# Patient Record
Sex: FEMALE | Race: WHITE | NOT HISPANIC OR LATINO | Employment: FULL TIME | URBAN - METROPOLITAN AREA
[De-identification: names, ages, dates, MRNs, and addresses within clinical notes are randomized per-mention and may not be internally consistent; named-entity substitution may affect disease eponyms.]

---

## 2017-03-23 ENCOUNTER — OFFICE VISIT (OUTPATIENT)
Dept: FAMILY MEDICINE | Facility: CLINIC | Age: 22
End: 2017-03-23
Payer: COMMERCIAL

## 2017-03-23 VITALS
BODY MASS INDEX: 24.75 KG/M2 | TEMPERATURE: 98.4 F | DIASTOLIC BLOOD PRESSURE: 62 MMHG | RESPIRATION RATE: 12 BRPM | SYSTOLIC BLOOD PRESSURE: 118 MMHG | HEIGHT: 66 IN | HEART RATE: 86 BPM | WEIGHT: 154 LBS

## 2017-03-23 DIAGNOSIS — M54.50 ACUTE LEFT-SIDED LOW BACK PAIN WITHOUT SCIATICA: Primary | ICD-10-CM

## 2017-03-23 LAB
ALBUMIN UR-MCNC: NEGATIVE MG/DL
AMORPH CRY #/AREA URNS HPF: ABNORMAL /HPF
APPEARANCE UR: CLEAR
BACTERIA #/AREA URNS HPF: ABNORMAL /HPF
BILIRUB UR QL STRIP: NEGATIVE
COLOR UR AUTO: YELLOW
GLUCOSE UR STRIP-MCNC: NEGATIVE MG/DL
HGB UR QL STRIP: ABNORMAL
KETONES UR STRIP-MCNC: NEGATIVE MG/DL
LEUKOCYTE ESTERASE UR QL STRIP: ABNORMAL
NITRATE UR QL: NEGATIVE
NON-SQ EPI CELLS #/AREA URNS LPF: ABNORMAL /LPF
PH UR STRIP: 7 PH (ref 5–7)
RBC #/AREA URNS AUTO: ABNORMAL /HPF (ref 0–2)
SP GR UR STRIP: 1.02 (ref 1–1.03)
URN SPEC COLLECT METH UR: ABNORMAL
UROBILINOGEN UR STRIP-ACNC: 0.2 EU/DL (ref 0.2–1)
WBC #/AREA URNS AUTO: ABNORMAL /HPF (ref 0–2)

## 2017-03-23 PROCEDURE — 99214 OFFICE O/P EST MOD 30 MIN: CPT | Performed by: NURSE PRACTITIONER

## 2017-03-23 PROCEDURE — 81001 URINALYSIS AUTO W/SCOPE: CPT | Performed by: NURSE PRACTITIONER

## 2017-03-23 RX ORDER — NAPROXEN 500 MG/1
500 TABLET ORAL 2 TIMES DAILY WITH MEALS
Qty: 30 TABLET | Refills: 1 | Status: SHIPPED | OUTPATIENT
Start: 2017-03-23 | End: 2017-11-27

## 2017-03-23 RX ORDER — METAXALONE 800 MG/1
800 TABLET ORAL 3 TIMES DAILY PRN
Qty: 30 TABLET | Refills: 0 | Status: SHIPPED | OUTPATIENT
Start: 2017-03-23 | End: 2017-11-27

## 2017-03-23 NOTE — MR AVS SNAPSHOT
"              After Visit Summary   3/23/2017    Rizwana Alford    MRN: [id number]           Patient Information     Date Of Birth          1995        Visit Information        Provider Department      3/23/2017 6:30 PM Haase, Susan Rachele, APRN CNP [facility]        Today's Diagnoses     Acute left-sided low back pain without sciatica    -  1       Follow-ups after your visit        Follow-up notes from your care team     Return in about 2 weeks (around 4/6/2017).      Who to contact     If you have questions or need follow up information about today's clinic visit or your schedule please contact [facility] directly at [phone].  Normal or non-critical lab and imaging results will be communicated to you by Verafinhart, letter or phone within 4 business days after the clinic has received the results. If you do not hear from us within 7 days, please contact the clinic through Verafinhart or phone. If you have a critical or abnormal lab result, we will notify you by phone as soon as possible.  Submit refill requests through ReClaims or call your pharmacy and they will forward the refill request to us. Please allow 3 business days for your refill to be completed.          Additional Information About Your Visit        MyChart Information     ReClaims gives you secure access to your electronic health record. If you see a primary care provider, you can also send messages to your care team and make appointments. If you have questions, please call your primary care clinic.  If you do not have a primary care provider, please call [phone] and they will assist you.        Care EveryWhere ID     This is your Care EveryWhere ID. This could be used by other organizations to access your [facility] medical records  [id number]        Your Vitals Were     Pulse Temperature Respirations Height BMI (Body Mass Index)       86 98.4  F (36.9  C) (Oral) 12 5' 6\" (1.676 m) 24.86 kg/m2        " Blood Pressure from Last 3 Encounters:   03/23/17 118/62   08/11/16 119/71   06/17/16 98/64    Weight from Last 3 Encounters:   03/23/17 154 lb (69.9 kg)   08/11/16 151 lb (68.5 kg)   06/17/16 143 lb (64.9 kg)              We Performed the Following     UA reflex to Microscopic and Culture     Urine Microscopic          Today's Medication Changes          These changes are accurate as of: 3/23/17  7:06 PM.  If you have any questions, ask your nurse or doctor.               Start taking these medicines.        Dose/Directions    metaxalone 800 MG tablet   Commonly known as:  SKELAXIN   Used for:  Acute left-sided low back pain without sciatica   Started by:  Haase, Susan Rachele, APRN CNP        Dose:  800 mg   Take 1 tablet (800 mg) by mouth 3 times daily as needed for moderate pain   Quantity:  30 tablet   Refills:  0       naproxen 500 MG tablet   Commonly known as:  NAPROSYN   Used for:  Acute left-sided low back pain without sciatica   Started by:  Haase, Susan Rachele, APRN CNP        Dose:  500 mg   Take 1 tablet (500 mg) by mouth 2 times daily (with meals)   Quantity:  30 tablet   Refills:  1            Where to get your medicines      These medications were sent to [place] [address] [place] [address] [address]     Phone:  [phone]     metaxalone 800 MG tablet    naproxen 500 MG tablet                Primary Care Provider    Valdo Umana       No address on file        Thank you!     Thank you for choosing [facility]  for your care. Our goal is always to provide you with excellent care. Hearing back from our patients is one way we can continue to improve our services. Please take a few minutes to complete the written survey that you may receive in the mail after your visit with us. Thank you!             Your Updated Medication List - Protect others around you: Learn how to safely use, store and throw away your medicines at  www.disposemymeds.org.          This list is accurate as of: 3/23/17  7:06 PM.  Always use your most recent med list.                   Brand Name Dispense Instructions for use    cyclobenzaprine 10 MG tablet    FLEXERIL    30 tablet    Take 0.5-1 tablets (5-10 mg) by mouth 3 times daily as needed for muscle spasms       metaxalone 800 MG tablet    SKELAXIN    30 tablet    Take 1 tablet (800 mg) by mouth 3 times daily as needed for moderate pain       naproxen 500 MG tablet    NAPROSYN    30 tablet    Take 1 tablet (500 mg) by mouth 2 times daily (with meals)       norethin-eth estradiol-fe 0.4-35 MG-MCG per tablet    FEMCON FE, WYMZA FE, ZENCHENT FE     Take  by mouth.

## 2017-03-23 NOTE — NURSING NOTE
"Chief Complaint   Patient presents with     Back Pain       Initial /62 (BP Location: Left arm, Patient Position: Chair, Cuff Size: Adult Regular)  Pulse 86  Temp 98.4  F (36.9  C) (Oral)  Resp 12  Ht 5' 6\" (1.676 m)  Wt 154 lb (69.9 kg)  BMI 24.86 kg/m2 Estimated body mass index is 24.86 kg/(m^2) as calculated from the following:    Height as of this encounter: 5' 6\" (1.676 m).    Weight as of this encounter: 154 lb (69.9 kg).  Medication Reconciliation: complete   Rizwana Noyola CMA      "

## 2017-03-23 NOTE — Clinical Note
Please abstract the following data from this visit with this patient into the appropriate field in Epic:  Pap smear done on this date: 7/1/2016 (approximately), by this group: urgent care, results were normal.  Chlamydia testing done on this date: 7/1/2016

## 2017-03-23 NOTE — PROGRESS NOTES
SUBJECTIVE:                                                    Rizwana Alford is a 21 year old female who presents to clinic today for the following health issues:    Back Pain      Duration: 3 days        Specific cause: lifting    Description:   Location of pain: low back left  Character of pain: sharp  Pain radiation:none  New numbness or weakness in legs, not attributed to pain:  no     Intensity: Currently 5/10, At its worst 8/10    History:   Pain interferes with job: yes, works as a   History of back problems: no prior back problems  Any previous MRI or X-rays: None  Sees a specialist for back pain:  No  Therapies tried without relief: cold, heat, muscle relaxants and NSAIDs    Alleviating factors:   Improved by: none      Precipitating factors:  Worsened by: Sitting    Functional and Psychosocial Screen (Cristi STarT Back):      Not performed today       Accompanying Signs & Symptoms:  Risk of Fracture:  None  Risk of Cauda Equina:  None  Risk of Infection:  None  Risk of Cancer:  None  Risk of Ankylosing Spondylitis:  Onset at age <35, male, AND morning back stiffness. no          Rizwana reports L sided lower back pain w/ onset of 2 days ago. 3 days ago she was lifting weights at the gym and did not experience any pain or signs of injury. The next morning she woke up in excruciating pain and unable to get out of bed. Pain is located in lumbar and iliac region of L back/hip.  Exacerbated by sitting up from lying down position and present in sitting position. Feels best when lying flat on back. Some relief after doing hip stretches. Rizwana is a  and states difficult to work w/ due to pain. Also difficulty sleeping. Has been icing and taking cyclobenzaprine to manage pain. Denies radiation of pain into LEs, paresthesia, or dysuria. LMP 3/21/2017.  Last episode of lower back pain was in 8/2016, lumbar xray at that time with normal result.    Problem list and histories reviewed & adjusted,  "as indicated.  Additional history: as documented    Patient Active Problem List   Diagnosis     Ovarian cysts     Past Surgical History:   Procedure Laterality Date     APPENDECTOMY       CYSTECTOMY OVARIAN BENIGN       TONSILLECTOMY         Social History   Substance Use Topics     Smoking status: Former Smoker     Smokeless tobacco: Never Used     Alcohol use No     Family History   Problem Relation Age of Onset     Colon Cancer Paternal Grandfather          Current Outpatient Prescriptions   Medication Sig Dispense Refill     cyclobenzaprine (FLEXERIL) 10 MG tablet Take 0.5-1 tablets (5-10 mg) by mouth 3 times daily as needed for muscle spasms 30 tablet 1     norethin-eth estradiol-fe 0.4-35 MG-MCG CHEW Take  by mouth.         No Known Allergies    Reviewed and updated as needed this visit by clinical staff    Reviewed and updated as needed this visit by Provider    ROS:  Constitutional, HEENT, cardiovascular, pulmonary, GI, , musculoskeletal, neuro, skin, endocrine and psych systems are negative, except as otherwise noted.    This document serves as a record of the services and decisions personally performed and made by Susan Haase, CNP. It was created on her behalf by Melida Salas, a trained medical scribe. The creation of this document is based the provider's statements to the medical scribe.  Melida Salas March 23, 2017 6:48 PM   OBJECTIVE:                                                    /62 (BP Location: Left arm, Patient Position: Chair, Cuff Size: Adult Regular)  Pulse 86  Temp 98.4  F (36.9  C) (Oral)  Resp 12  Ht 1.676 m (5' 6\")  Wt 69.9 kg (154 lb)  BMI 24.86 kg/m2  Body mass index is 24.86 kg/(m^2).  GENERAL: healthy, alert and no distress  RESP: lungs clear to auscultation - no rales, rhonchi or wheezes  CV: regular rate and rhythm, normal S1 S2, no S3 or S4, no murmur, click or rub, no peripheral edema   MS: Pain in lumbar and iliac region of L side of back and hip w/ " adduction of both legs (L>R), abduction of L leg, and flexion, very tender to palpation, no gross musculoskeletal defects noted, no edema  NEURO: Normal strength, tone, reflexes, able to walk on toes and heels, straight leg raise negative.  mentation intact and speech normal  PSYCH: mentation appears normal, affect normal/bright    Diagnostic Test Results:  No results found for this or any previous visit (from the past 24 hour(s)).     ASSESSMENT/PLAN:                                                    Rizwana was seen today for back pain.    Diagnoses and all orders for this visit:    Acute left-sided low back pain without sciatica:  UA with scant blood (has menses)  -     UA reflex to Microscopic and Culture  -     naproxen (NAPROSYN) 500 MG tablet; Take 1 tablet (500 mg) by mouth 2 times daily (with meals)  -     metaxalone (SKELAXIN) 800 MG tablet; Take 1 tablet (800 mg) by mouth 3 times daily as needed for moderate pain  -     Urine Microscopic  -     [facility] PT, HAND, AND CHIROPRACTIC REFERRAL    Discussed taking naproxen bid on a regular basis for next 3-4 days with food.  Take skelaxin prn.  Continue ice for 15-20 min tid and stretching.  Is interested in PT.    Follow up in 1-2 weeks, sooner as needed.  The information in this document, created by the medical scribe for me, accurately reflects the services I personally performed and the decisions made by me. I have reviewed and approved this document for accuracy.   Susan Haase, CNP Susan Haase, MIRTHA [facility] [place]

## 2017-03-28 ENCOUNTER — TELEPHONE (OUTPATIENT)
Dept: FAMILY MEDICINE | Facility: CLINIC | Age: 22
End: 2017-03-28

## 2017-03-28 NOTE — LETTER
[facility]  [address] [place], [place], [address]  [phone]        2017      Rizwana Alford  [address] [facility] [address]        Dear Rizwana,    I would like you to come in for a Chlamydia screen. This is important if you have ever been sexually active. Chlamydia is the most common sexually transmitted infection and because it is often without symptoms, the infection can go untreated. Thankfully, testing and treatment is easy. Chlamydia is diagnosed through a simple urine test (if you have not urinated in the last hour) or vaginal swab. The CDC and the [place] [facility] Department recommend all women who are sexually active and under the age of 26 years old be screened for Chlamydia.    *Three out of four women with Chlamydia have no Chlamydia symptoms.   *Half of men with Chlamydia have no Chlamydia symptoms.     Chlamydia is spread by:  *Vaginal sex    *Oral sex   *Anal sex       *Infected mother to     If left untreated, Chlamydia can:  *Spread to sex partners     *Lead to ectopic (tubal) pregnancy   *Lead to pelvic inflammatory disease    *Lead to infertility in men and women   *Make it easier to transmit or acquire HIV during sex    *Can be passed to  during childbirth and cause serious eye infection or pneumonia   *Can lead to premature delivery and low birth weight    NOTE: A person can be re-infected after treatment if re-exposed    Please make an appointment to see me so we can do this screening test and ensure that your health is protected. For an appointment call:  [facility] [phone]    Sincerely,  [facility]

## 2017-03-28 NOTE — TELEPHONE ENCOUNTER
Panel Management Review      Patient has the following on her problem list: None      Composite cancer screening  Chart review shows that this patient is due/due soon for the following chlamydia screening    Summary:    Patient is due/failing the following:   Chlamydia screening    Action needed:   Patient needs non-fasting lab only appointment    Type of outreach:    Sent letter.    Questions for provider review:    None                                                                                                                                    TONG Hernandez       Chart routed to Care Team .

## 2017-10-30 ENCOUNTER — OFFICE VISIT (OUTPATIENT)
Dept: FAMILY MEDICINE | Facility: CLINIC | Age: 22
End: 2017-10-30
Payer: COMMERCIAL

## 2017-10-30 VITALS
TEMPERATURE: 98.3 F | WEIGHT: 149.7 LBS | OXYGEN SATURATION: 98 % | SYSTOLIC BLOOD PRESSURE: 119 MMHG | DIASTOLIC BLOOD PRESSURE: 65 MMHG | BODY MASS INDEX: 24.06 KG/M2 | HEIGHT: 66 IN | RESPIRATION RATE: 16 BRPM | HEART RATE: 68 BPM

## 2017-10-30 DIAGNOSIS — N76.0 BACTERIAL VAGINITIS: Primary | ICD-10-CM

## 2017-10-30 DIAGNOSIS — B96.89 BACTERIAL VAGINITIS: Primary | ICD-10-CM

## 2017-10-30 LAB
SPECIMEN SOURCE: ABNORMAL
WET PREP SPEC: ABNORMAL

## 2017-10-30 PROCEDURE — 87210 SMEAR WET MOUNT SALINE/INK: CPT | Performed by: FAMILY MEDICINE

## 2017-10-30 PROCEDURE — 99213 OFFICE O/P EST LOW 20 MIN: CPT | Performed by: FAMILY MEDICINE

## 2017-10-30 RX ORDER — METRONIDAZOLE 500 MG/1
500 TABLET ORAL 3 TIMES DAILY
Qty: 21 TABLET | Refills: 0 | Status: SHIPPED | OUTPATIENT
Start: 2017-10-30 | End: 2017-11-27

## 2017-10-30 NOTE — MR AVS SNAPSHOT
"              After Visit Summary   10/30/2017    Rizwana Alford    MRN: [id number]           Patient Information     Date Of Birth          1995        Visit Information        Provider Department      10/30/2017 11:00 AM Kendell Mullins MD [facility]        Today's Diagnoses     Bacterial vaginitis    -  1       Follow-ups after your visit        Who to contact     If you have questions or need follow up information about today's clinic visit or your schedule please contact [facility] directly at [phone].  Normal or non-critical lab and imaging results will be communicated to you by Apsehart, letter or phone within 4 business days after the clinic has received the results. If you do not hear from us within 7 days, please contact the clinic through Cogniot or phone. If you have a critical or abnormal lab result, we will notify you by phone as soon as possible.  Submit refill requests through Udorse or call your pharmacy and they will forward the refill request to us. Please allow 3 business days for your refill to be completed.          Additional Information About Your Visit        MyChart Information     Udorse gives you secure access to your electronic health record. If you see a primary care provider, you can also send messages to your care team and make appointments. If you have questions, please call your primary care clinic.  If you do not have a primary care provider, please call [phone] and they will assist you.        Care EveryWhere ID     This is your Care EveryWhere ID. This could be used by other organizations to access your [place] medical records  [id number]        Your Vitals Were     Pulse Temperature Respirations Height Last Period Pulse Oximetry    68 98.3  F (36.8  C) (Oral) 16 5' 6\" (1.676 m) 10/23/2017 98%    Breastfeeding? BMI (Body Mass Index)                No 24.16 kg/m2           Blood Pressure from Last 3 Encounters: "   10/30/17 119/65   03/23/17 118/62   08/11/16 119/71    Weight from Last 3 Encounters:   10/30/17 149 lb 11.2 oz (67.9 kg)   03/23/17 154 lb (69.9 kg)   08/11/16 151 lb (68.5 kg)              We Performed the Following     Wet prep          Today's Medication Changes          These changes are accurate as of: 10/30/17 12:11 PM.  If you have any questions, ask your nurse or doctor.               Start taking these medicines.        Dose/Directions    metroNIDAZOLE 500 MG tablet   Commonly known as:  FLAGYL   Used for:  Bacterial vaginitis   Started by:  Kendell Mullins MD        Dose:  500 mg   Take 1 tablet (500 mg) by mouth 3 times daily   Quantity:  21 tablet   Refills:  0            Where to get your medicines      These medications were sent to [place] [address] [place] [address] [address]     Phone:  [phone]     metroNIDAZOLE 500 MG tablet                Primary Care Provider    Valdo Umana       No address on file        Equal Access to Services     [facility]: Hadii bridger webster hadasho Soomaali, waaxda luqadaha, qaybta kaalmada adeegyada, rita hernandez . So [facility] [phone].    ATENCIÓN: Si habla español, tiene a rojas disposición servicios gratuitos de asistencia lingüística. Llame al [phone].    We comply with applicable federal civil rights laws and [place] laws. We do not discriminate on the basis of race, color, national origin, age, disability, sex, sexual orientation, or gender identity.            Thank you!     Thank you for choosing [facility]  for your care. Our goal is always to provide you with excellent care. Hearing back from our patients is one way we can continue to improve our services. Please take a few minutes to complete the written survey that you may receive in the mail after your visit with us. Thank you!             Your Updated Medication List - Protect others  around you: Learn how to safely use, store and throw away your medicines at www.disposemymeds.org.          This list is accurate as of: 10/30/17 12:11 PM.  Always use your most recent med list.                   Brand Name Dispense Instructions for use Diagnosis    metaxalone 800 MG tablet    SKELAXIN    30 tablet    Take 1 tablet (800 mg) by mouth 3 times daily as needed for moderate pain    Acute left-sided low back pain without sciatica       metroNIDAZOLE 500 MG tablet    FLAGYL    21 tablet    Take 1 tablet (500 mg) by mouth 3 times daily    Bacterial vaginitis       naproxen 500 MG tablet    NAPROSYN    30 tablet    Take 1 tablet (500 mg) by mouth 2 times daily (with meals)    Acute left-sided low back pain without sciatica       norethin-eth estradiol-fe 0.4-35 MG-MCG per tablet    FEMCON FE, WYMZA FE, ZENCHENT FE     Take  by mouth.

## 2017-10-30 NOTE — NURSING NOTE
"Chief Complaint   Patient presents with     Vaginal Problem       Initial /65 (BP Location: Right arm, Patient Position: Chair, Cuff Size: Adult Regular)  Pulse 68  Temp 98.3  F (36.8  C) (Oral)  Resp 16  Ht 5' 6\" (1.676 m)  Wt 149 lb 11.2 oz (67.9 kg)  LMP 10/23/2017  SpO2 98%  Breastfeeding? No  BMI 24.16 kg/m2 Estimated body mass index is 24.16 kg/(m^2) as calculated from the following:    Height as of this encounter: 5' 6\" (1.676 m).    Weight as of this encounter: 149 lb 11.2 oz (67.9 kg).  Medication Reconciliation: complete   "

## 2017-10-30 NOTE — PROGRESS NOTES
"  SUBJECTIVE:   Rizwaan Alford is a 22 year old female who presents to clinic today for the following health issues:      Vaginal Symptoms  Onset: 1 month    Description:  Vaginal Discharge: creamy   Itching (Pruritis): YES  Burning sensation:  no   Odor: YES    Accompanying Signs & Symptoms:  Pain with Urination: no   Abdominal Pain: no   Fever: no     History:   Sexually active: no   New Partner: no   Possibility of Pregnancy:  No    Precipitating factors:   Recent Antibiotic Use: no     Alleviating factors:  none    Therapies Tried and outcome: none    Past Medical History:   Diagnosis Date     Ovarian cysts        Past Surgical History:   Procedure Laterality Date     APPENDECTOMY       CYSTECTOMY OVARIAN BENIGN       TONSILLECTOMY         Family History   Problem Relation Age of Onset     Colon Cancer Paternal Grandfather        Social History   Substance Use Topics     Smoking status: Former Smoker     Smokeless tobacco: Never Used     Alcohol use No     Wet prep nsv and trich: flagyl to treat both prn yeat treatment if flares,     First Bacterial vaginitis episode    (N76.0,  B96.89) Bacterial vaginitis  (primary encounter diagnosis)  Comment:   Plan: Wet prep, metroNIDAZOLE (FLAGYL) 500 MG tablet          No new partners, no pelvic pain,                ROS:  Constitutional, HEENT, cardiovascular, pulmonary, gi and gu systems are negative, except as otherwise noted.      OBJECTIVE:   /65 (BP Location: Right arm, Patient Position: Chair, Cuff Size: Adult Regular)  Pulse 68  Temp 98.3  F (36.8  C) (Oral)  Resp 16  Ht 5' 6\" (1.676 m)  Wt 149 lb 11.2 oz (67.9 kg)  LMP 10/23/2017  SpO2 98%  Breastfeeding? No  BMI 24.16 kg/m2  Body mass index is 24.16 kg/(m^2).   GENERAL: healthy, alert and no distress  EYES: Eyes grossly normal to inspection, PERRL and conjunctivae and sclerae normal  HENT: ear canals and TM's normal, nose and mouth without ulcers or lesions  NECK: no adenopathy, no asymmetry, " masses, or scars and thyroid normal to palpation  RESP: lungs clear to auscultation - no rales, rhonchi or wheezes  CV: regular rate and rhythm, normal S1 S2, no S3 or S4, no murmur, click or rub, no peripheral edema and peripheral pulses strong  ABDOMEN: soft, nontender, no hepatosplenomegaly, no masses and bowel sounds normal  SKIN: no suspicious lesions or rashes        Follow up prn with her primary doctor    Kendell Mullins MD  [facility]

## 2017-11-03 ENCOUNTER — TELEPHONE (OUTPATIENT)
Dept: FAMILY MEDICINE | Facility: CLINIC | Age: 22
End: 2017-11-03

## 2017-11-03 DIAGNOSIS — N76.0 ACUTE VAGINITIS: ICD-10-CM

## 2017-11-03 DIAGNOSIS — R11.2 NON-INTRACTABLE VOMITING WITH NAUSEA, UNSPECIFIED VOMITING TYPE: Primary | ICD-10-CM

## 2017-11-03 RX ORDER — ONDANSETRON 4 MG/1
4 TABLET, FILM COATED ORAL EVERY 8 HOURS PRN
Qty: 12 TABLET | Refills: 1 | Status: SHIPPED | OUTPATIENT
Start: 2017-11-03 | End: 2017-11-27

## 2017-11-03 RX ORDER — METRONIDAZOLE 7.5 MG/G
1 GEL VAGINAL 2 TIMES DAILY
Qty: 70 G | Refills: 0 | Status: SHIPPED | OUTPATIENT
Start: 2017-11-03 | End: 2017-11-08

## 2017-11-03 NOTE — TELEPHONE ENCOUNTER
Patient calling and states was vomiting all night last night from the Flagyl.  Wondering about alterative or something for nausea.  Discussed vaginal gel.  Wondering if can get something for nausea.  Please advise.  Call her back at [phone].  Xochilt Alonso RN

## 2017-11-04 ENCOUNTER — NURSE TRIAGE (OUTPATIENT)
Dept: NURSING | Facility: CLINIC | Age: 22
End: 2017-11-04

## 2017-11-04 NOTE — TELEPHONE ENCOUNTER
Clinic Action Needed:Patient states she is returning a clinic call from 11:00 AM today. Patient not certain the reason for the call today and no information found per  [facility]. Patient states she has used the Metrogel and nausea is still present, but had improved. Declined triage and declined to have telephone message sent to provider and will contact clinic during hours. Patient advised to call back if any further questions or concerns.           Additional Information    Negative: Nursing judgment    Negative: Information only call; adult is not ill or injured    Negative: Nursing judgment    Negative: Nursing judgment    Negative: Nursing judgment    Negative: Nursing judgment    Negative: Nursing judgment    Negative: Nursing judgment    Negative: Nursing judgment    Negative: Nursing judgment    Negative: Nursing judgment    Negative: Nursing judgment    Negative: Nursing judgment    Negative: Nursing judgment    Negative: Nursing judgment    Nursing judgment    Protocols used: NO GUIDELINE OR REFERENCE AVAILABLE-ADULT-

## 2017-11-04 NOTE — TELEPHONE ENCOUNTER
----- Message from Ruddy Terry sent at 11/4/2017  1:48 PM CDT -----  Reason for call:  Other   Patient called regarding (reason for call): call back  Additional comments: No.    Phone number to reach patient:  Home number on file [phone] (home)    Best Time:  Anytime.    Can we leave a detailed message on this number?  YES

## 2017-11-21 ENCOUNTER — TELEPHONE (OUTPATIENT)
Dept: FAMILY MEDICINE | Facility: CLINIC | Age: 22
End: 2017-11-21

## 2017-11-21 DIAGNOSIS — M54.50 ACUTE BILATERAL LOW BACK PAIN WITHOUT SCIATICA: ICD-10-CM

## 2017-11-21 NOTE — TELEPHONE ENCOUNTER
Pending Prescriptions:                       Disp   Refills    cyclobenzaprine (FLEXERIL) 10 MG tablet [*30 tab*1            Sig: TAKE ONE-HALF TO ONE TABLET (5-10 MG) BY MOUTH           THREE TIMES A DAY AS NEEDED FOR MUSCLE SPASMS    Controlled Substance Refill Request for   Problem List Complete:  No     PROVIDER TO CONSIDER COMPLETION OF PROBLEM LIST AND OVERVIEW/CONTROLLED SUBSTANCE AGREEMENT    Last Written Prescription Date:  08/11/2016  Last Fill Quantity: 30,   # refills: 1    [place] [facility] [place]: 10/30/2017    Future Office visit:     Controlled substance agreement on file: No.     Processing:  Fax Rx to [facility] pharmacy     checked in past 6 months?  No, route to SHEKHAR BROWNING

## 2017-11-24 RX ORDER — CYCLOBENZAPRINE HCL 10 MG
TABLET ORAL
Qty: 30 TABLET | Refills: 1 | OUTPATIENT
Start: 2017-11-24

## 2017-11-27 ENCOUNTER — OFFICE VISIT (OUTPATIENT)
Dept: FAMILY MEDICINE | Facility: CLINIC | Age: 22
End: 2017-11-27
Payer: COMMERCIAL

## 2017-11-27 VITALS
TEMPERATURE: 98.6 F | RESPIRATION RATE: 20 BRPM | HEART RATE: 83 BPM | BODY MASS INDEX: 24.37 KG/M2 | DIASTOLIC BLOOD PRESSURE: 83 MMHG | WEIGHT: 151 LBS | SYSTOLIC BLOOD PRESSURE: 126 MMHG

## 2017-11-27 DIAGNOSIS — M54.50 BILATERAL LOW BACK PAIN WITHOUT SCIATICA, UNSPECIFIED CHRONICITY: Primary | ICD-10-CM

## 2017-11-27 PROCEDURE — 99213 OFFICE O/P EST LOW 20 MIN: CPT | Performed by: PHYSICIAN ASSISTANT

## 2017-11-27 RX ORDER — CYCLOBENZAPRINE HCL 10 MG
5-10 TABLET ORAL 3 TIMES DAILY PRN
Qty: 30 TABLET | Refills: 1 | Status: SHIPPED | OUTPATIENT
Start: 2017-11-27 | End: 2019-06-06

## 2017-11-27 NOTE — PROGRESS NOTES
SUBJECTIVE:   Rizwana Alford is a 22 year old female who presents to clinic today for the following health issues:      Back Pain       Duration: 03/2017        Specific cause: possibly from working out/lifting weights    Description:   Location of pain: low back - left  Character of pain: sharp/stabbing  Pain radiation:radiates into the left buttocks  New numbness or weakness in legs, not attributed to pain:  no     Intensity: Currently 3/10, At its worst 10/10    History:   Pain interferes with job: YES  History of back problems: none  Any previous MRI or X-rays: None  Sees a specialist for back pain:  No-patient was referred to physical therapy-did not follow up  Therapies tried without relief: ibuprofen    Alleviating factors:   Improved by: flexeril      Precipitating factors:  Worsened by: bending, lifting    Functional and Psychosocial Screen (Cristi STarT Back):      Not performed today          Accompanying Signs & Symptoms:  Risk of Fracture:  None  Risk of Cauda Equina:  None  Risk of Infection:  None  Risk of Cancer:  None  Risk of Ankylosing Spondylitis:  Onset at age <35, male, AND morning back stiffness. no             Problem list and histories reviewed & adjusted, as indicated.  Additional history: as documented    Patient Active Problem List   Diagnosis     Ovarian cysts     Past Surgical History:   Procedure Laterality Date     APPENDECTOMY       CYSTECTOMY OVARIAN BENIGN       TONSILLECTOMY         Social History   Substance Use Topics     Smoking status: Former Smoker     Smokeless tobacco: Never Used     Alcohol use No     Family History   Problem Relation Age of Onset     Colon Cancer Paternal Grandfather          Current Outpatient Prescriptions   Medication Sig Dispense Refill     ORTHO TRI-CYCLEN, 28, 0.18/0.215/0.25 MG-35 MCG per tablet        cyclobenzaprine (FLEXERIL) 10 MG tablet Take 0.5-1 tablets (5-10 mg) by mouth 3 times daily as needed for muscle spasms 30 tablet 1     No Known  Allergies  BP Readings from Last 3 Encounters:   11/27/17 126/83   10/30/17 119/65   03/23/17 118/62    Wt Readings from Last 3 Encounters:   11/27/17 151 lb (68.5 kg)   10/30/17 149 lb 11.2 oz (67.9 kg)   03/23/17 154 lb (69.9 kg)                        Reviewed and updated as needed this visit by clinical staffTobacco  Allergies  Meds  Problems  Med Hx  Surg Hx  Fam Hx  Soc Hx        Reviewed and updated as needed this visit by Provider  Allergies  Meds  Problems         ROS:  Constitutional, msk, neuro, cardiovascular, pulmonary, gi and gu systems are negative, except as otherwise noted.      OBJECTIVE:   /83 (BP Location: Right arm, Patient Position: Chair, Cuff Size: Adult Large)  Pulse 83  Temp 98.6  F (37  C) (Oral)  Resp 20  Wt 151 lb (68.5 kg)  LMP 11/26/2017  BMI 24.37 kg/m2  Body mass index is 24.37 kg/(m^2).  GENERAL: healthy, alert and no distress  Comprehensive back pain exam:  Tenderness of left SI joint, Range of motion not limited by pain, Lower extremity strength functional and equal on both sides, Lower extremity reflexes within normal limits bilaterally, Lower extremity sensation normal and equal on both sides and Straight leg raise negative bilaterally  PSYCH: mentation appears normal, affect normal/bright    Diagnostic Test Results:  none     ASSESSMENT/PLAN:     (M54.5) Bilateral low back pain without sciatica, unspecified chronicity  (primary encounter diagnosis)  Comment: likely si joint dysfunction given history, exam, and recurrence. Has responded well to flexeril in the past. This will again be prescribed and also discussed scheduled nsaids. Has not followed up with therapy. Encouraging this to not only aid with recovery but also to aid with future prevention.   Plan: cyclobenzaprine (FLEXERIL) 10 MG tablet, RAYRAY         PT, HAND, AND CHIROPRACTIC REFERRAL        -Medication use and side effects discussed with the patient. Patient is in complete understanding and  agreement with plan.         Follow up: ernie Payne PA-C  [facility]

## 2017-11-27 NOTE — MR AVS SNAPSHOT
After Visit Summary   11/27/2017    Rizwana Alford    MRN: [id number]           Patient Information     Date Of Birth          1995        Visit Information        Provider Department      11/27/2017 8:30 AM Wagner Payne PA-C [facility]        Today's Diagnoses     Bilateral low back pain without sciatica, unspecified chronicity    -  1       Follow-ups after your visit        Additional Services     RAYRAY PT, HAND, AND CHIROPRACTIC REFERRAL       **This order will print in the [facility] Scheduling Office**    Physical Therapy, Hand Therapy and Chiropractic Care are available through:    *[facility]  *[facility]  *[facility]    Call one number to schedule at any of the above locations: [phone].    Your provider has referred you to: Physical Therapy at [facility] or [facility]    Indication/Reason for Referral: Low Back Pain  Onset of Illness: years  Therapy Orders: Evaluate and Treat  Special Programs: None  Special Request: None    Cristi Colin      Additional Comments for the Therapist or Chiropractor:     Please be aware that coverage of these services is subject to the terms and limitations of your health insurance plan.  Call member services at your health plan with any benefit or coverage questions.      Please bring the following to your appointment:    *Your personal calendar for scheduling future appointments  *Comfortable clothing                  Who to contact     If you have questions or need follow up information about today's clinic visit or your schedule please contact [facility] directly at [phone].  Normal or non-critical lab and imaging results will be communicated to you by MyChart, letter or phone within 4 business days after the clinic has received the results. If you do not hear from us within 7 days, please contact the clinic through MyChart or phone. If you have a critical  or abnormal lab result, we will notify you by phone as soon as possible.  Submit refill requests through [employer] or call your pharmacy and they will forward the refill request to us. Please allow 3 business days for your refill to be completed.          Additional Information About Your Visit        IPNetVoicehart Information     [employer] gives you secure access to your electronic health record. If you see a primary care provider, you can also send messages to your care team and make appointments. If you have questions, please call your primary care clinic.  If you do not have a primary care provider, please call [phone] and they will assist you.        Care EveryWhere ID     This is your Care EveryWhere ID. This could be used by other organizations to access your [place] medical records  [id number]        Your Vitals Were     Pulse Temperature Respirations Last Period BMI (Body Mass Index)       83 98.6  F (37  C) (Oral) 20 11/26/2017 24.37 kg/m2        Blood Pressure from Last 3 Encounters:   11/27/17 126/83   10/30/17 119/65   03/23/17 118/62    Weight from Last 3 Encounters:   11/27/17 151 lb (68.5 kg)   10/30/17 149 lb 11.2 oz (67.9 kg)   03/23/17 154 lb (69.9 kg)              We Performed the Following     RAYRAY PT, HAND, AND CHIROPRACTIC REFERRAL          Today's Medication Changes          These changes are accurate as of: 11/27/17  8:56 AM.  If you have any questions, ask your nurse or doctor.               Start taking these medicines.        Dose/Directions    cyclobenzaprine 10 MG tablet   Commonly known as:  FLEXERIL   Used for:  Bilateral low back pain without sciatica, unspecified chronicity   Started by:  Wagner Payne PA-C        Dose:  5-10 mg   Take 0.5-1 tablets (5-10 mg) by mouth 3 times daily as needed for muscle spasms   Quantity:  30 tablet   Refills:  1         Stop taking these medicines if you haven't already. Please contact your care team if you have questions.     metaxalone 800  MG tablet   Commonly known as:  SKELAXIN   Stopped by:  Wagner Payne PA-C           norethin-eth estradiol-fe 0.4-35 MG-MCG per tablet   Commonly known as:  FEMCON FE, WYMZA FE, ZENCHENT FE   Stopped by:  Wagner Payne PA-C                Where to get your medicines      These medications were sent to [place] [id number] [facility] - [address] [facility] [address]     Phone:  [phone]     cyclobenzaprine 10 MG tablet                Primary Care Provider    Valdo Umana       No address on file        Equal Access to Services     [facility]: Hadii bridger webster hadasho Soomaali, waaxda luqadaha, qaybta kaalmada adeegyada, rita hernandez . So [facility] [phone].    ATENCIÓN: Si habla español, tiene a rojas disposición servicios gratuitos de asistencia lingüística. [facility] [phone].    We comply with applicable federal civil rights laws and [place] laws. We do not discriminate on the basis of race, color, national origin, age, disability, sex, sexual orientation, or gender identity.            Thank you!     Thank you for choosing [facility]  for your care. Our goal is always to provide you with excellent care. Hearing back from our patients is one way we can continue to improve our services. Please take a few minutes to complete the written survey that you may receive in the mail after your visit with us. Thank you!             Your Updated Medication List - Protect others around you: Learn how to safely use, store and throw away your medicines at www.disposemymeds.org.          This list is accurate as of: 11/27/17  8:56 AM.  Always use your most recent med list.                   Brand Name Dispense Instructions for use Diagnosis    cyclobenzaprine 10 MG tablet    FLEXERIL    30 tablet    Take 0.5-1 tablets (5-10 mg) by mouth 3 times daily as needed for muscle spasms    Bilateral low back pain without  sciatica, unspecified chronicity       ORTHO TRI-CYCLEN (28) 0.18/0.215/0.25 MG-35 MCG per tablet   Generic drug:  norgestim-eth estrad triphasic

## 2017-11-29 ENCOUNTER — THERAPY VISIT (OUTPATIENT)
Dept: PHYSICAL THERAPY | Facility: CLINIC | Age: 22
End: 2017-11-29
Payer: COMMERCIAL

## 2017-11-29 DIAGNOSIS — M54.42 CHRONIC LEFT-SIDED LOW BACK PAIN WITH LEFT-SIDED SCIATICA: Primary | ICD-10-CM

## 2017-11-29 DIAGNOSIS — G89.29 CHRONIC LEFT-SIDED LOW BACK PAIN WITH LEFT-SIDED SCIATICA: Primary | ICD-10-CM

## 2017-11-29 PROCEDURE — 97161 PT EVAL LOW COMPLEX 20 MIN: CPT | Mod: GP | Performed by: PHYSICAL THERAPIST

## 2017-11-29 PROCEDURE — 97112 NEUROMUSCULAR REEDUCATION: CPT | Mod: GP | Performed by: PHYSICAL THERAPIST

## 2017-11-29 PROCEDURE — 97110 THERAPEUTIC EXERCISES: CPT | Mod: GP | Performed by: PHYSICAL THERAPIST

## 2017-11-29 NOTE — PROGRESS NOTES
Subjective:    Patient is a 22 year old female presenting with rehab back hpi. The history is provided by the patient. No  was used.   Rizwana Alford is a 22 year old female with a lumbar condition.  Condition occurred with:  Lifting and repetition/overuse.  Condition occurred: during recreation/sport.  This is a recurrent condition  Pt c/o LBP initially starting 3/2017 after working out/lifting.  Saw MD at that time and noted improvement with medications.  States symptoms returned 11/26/17 after doing squats.  MD order date 11/27/17..    Patient reports pain:  Lumbar spine left, central lumbar spine, mid lumbar spine and lower lumbar spine.  Radiates to:  Gluteals left.  Pain is described as shooting, stabbing and aching and is intermittent and reported as 3/10 and 8/10.   Pain is the same all the time (stiff in AM).  Symptoms are exacerbated by bending, twisting, lifting, standing and sitting and relieved by rest and muscle relaxants.  Since onset symptoms are gradually improving.        General health as reported by patient is good.  Pertinent medical history includes:  None.  Medical allergies: no.  Other surgeries include:  No.  Current medications:  Muscle relaxants.  Current occupation is /student.    Primary job tasks include:  Prolonged standing, lifting and repetitive tasks.                                Objective:    System         Lumbar/SI Evaluation  ROM:  Arom wnl lumbar: LES: pain during/after, HILDA NE duirng, increased ROM and centralized pain.  AROM Lumbar:   Flexion:          Mod loss +  Ext:                    Mod loss +   Side Bend:        Left:  ERT    Right:  Min loss +  Rotation:           Left:     Right:   Side Glide:        Left:     Right:         Strength: fair core stab recruitment-++with bridge.  abd strength 4-/5  Lumbar Myotomes:  normal                  Neural Tension/Mobility:  Lumbar:  Normal        Lumbar Palpation:    Tenderness present at Left:     Erector Spinae and PSIS          SI joint/Sacrum:    Equal heights                                                       General     ROS    Assessment/Plan:      Patient is a 22 year old female with lumbar complaints.    Patient has the following significant findings with corresponding treatment plan.                Diagnosis 1:  LBP  Pain -  hot/cold therapy, manual therapy, directional preference exercise and home program  Decreased ROM/flexibility - manual therapy and therapeutic exercise  Decreased strength - therapeutic exercise and therapeutic activities  Impaired muscle performance - neuro re-education  Decreased function - therapeutic activities  Impaired posture - neuro re-education    Therapy Evaluation Codes:   1) History comprised of:   Personal factors that impact the plan of care:      None.    Comorbidity factors that impact the plan of care are:      None.     Medications impacting care: Muscle relaxant.  2) Examination of Body Systems comprised of:   Body structures and functions that impact the plan of care:      Lumbar spine.   Activity limitations that impact the plan of care are:      Bending, Lifting, Sitting, Standing and Working.  3) Clinical presentation characteristics are:   Stable/Uncomplicated.  4) Decision-Making    Low complexity using standardized patient assessment instrument and/or measureable assessment of functional outcome.  Cumulative Therapy Evaluation is: Low complexity.    Previous and current functional limitations:  (See Goal Flow Sheet for this information)    Short term and Long term goals: (See Goal Flow Sheet for this information)     Communication ability:  Patient appears to be able to clearly communicate and understand verbal and written communication and follow directions correctly.  Treatment Explanation - The following has been discussed with the patient:   RX ordered/plan of care  Anticipated outcomes  Possible risks and side effects  This patient would benefit  from PT intervention to resume normal activities.   Rehab potential is excellent.    Frequency:  1 X week, once daily  Duration:  for 8 weeks  Discharge Plan:  Achieve all LTG.  Independent in home treatment program.  Reach maximal therapeutic benefit.    Please refer to the daily flowsheet for treatment today, total treatment time and time spent performing 1:1 timed codes.

## 2017-11-29 NOTE — MR AVS SNAPSHOT
After Visit Summary   11/29/2017    Rizwana Alford    MRN: [id number]           Patient Information     Date Of Birth          1995        Visit Information        Provider Department      11/29/2017 8:20 AM Rashid Crum, KASSIE [facility] [place] [facility]        Today's Diagnoses     Chronic left-sided low back pain with left-sided sciatica    -  1       Follow-ups after your visit        Your next 10 appointments already scheduled     Dec 06, 2017  9:00 AM CST   RAYRAY Spine with Rashid Crum PT   [facility] [place] [facility] ([facility])    [address]  [facility] [id number] [phone]              Who to contact     If you have questions or need follow up information about today's clinic visit or your schedule please contact [facility] PHYSICAL THERAPY directly at [phone].  Normal or non-critical lab and imaging results will be communicated to you by Crispy Gamerhart, letter or phone within 4 business days after the clinic has received the results. If you do not hear from us within 7 days, please contact the clinic through Crispy Gamerhart or phone. If you have a critical or abnormal lab result, we will notify you by phone as soon as possible.  Submit refill requests through [employer] or call your pharmacy and they will forward the refill request to us. Please allow 3 business days for your refill to be completed.          Additional Information About Your Visit        Crispy Gamerhart Information     [employer] gives you secure access to your electronic health record. If you see a primary care provider, you can also send messages to your care team and make appointments. If you have questions, please call your primary care clinic.  If you do not have a primary care provider, please call [phone] and they will assist you.        Care EveryWhere ID     This is your Care  EveryWhere ID. This could be used by other organizations to access your [place] medical records  [id number]        Your Vitals Were     Last Period                   11/26/2017            Blood Pressure from Last 3 Encounters:   11/27/17 126/83   10/30/17 119/65   03/23/17 118/62    Weight from Last 3 Encounters:   11/27/17 68.5 kg (151 lb)   10/30/17 67.9 kg (149 lb 11.2 oz)   03/23/17 69.9 kg (154 lb)              We Performed the Following     HC PT EVAL, LOW COMPLEXITY     RAYRAY INITIAL EVAL REPORT     NEUROMUSCULAR RE-EDUCATION     THERAPEUTIC EXERCISES        Primary Care Provider    Valdo Umana       No address on file        Equal Access to Services     [facility] : Hadii aad darin hadsantio Sojonathan, waaxda luqadaha, qaybta kaalmada adejaradyada, rita hernandez . So [facility] [phone].    ATENCIÓN: Si habla español, tiene a rojas disposición servicios gratuitos de asistencia lingüística. Llame al [phone].    We comply with applicable federal civil rights laws and [place] laws. We do not discriminate on the basis of race, color, national origin, age, disability, sex, sexual orientation, or gender identity.            Thank you!     Thank you for [facility]ICAL TH[facility]AP[facility]  for your care. Our goal is always to provide you with excellent care. Hearing back from our patients is one way we can continue to improve our services. Please take a few minutes to complete the written survey that you may receive in the mail after your visit with us. Thank you!             Your Updated Medication List - Protect others around you: Learn how to safely use, store and throw away your medicines at www.disposemymeds.org.          This list is accurate as of: 11/29/17  8:47 AM.  Always use your most recent med list.                   Brand Name Dispense Instructions for use Diagnosis    cyclobenzaprine 10 MG tablet    FLEXERIL    30 tablet    Take 0.5-1  tablets (5-10 mg) by mouth 3 times daily as needed for muscle spasms    Bilateral low back pain without sciatica, unspecified chronicity       ORTHO TRI-CYCLEN (28) 0.18/0.215/0.25 MG-35 MCG per tablet   Generic drug:  norgestim-eth estrad triphasic

## 2017-12-04 ENCOUNTER — TELEPHONE (OUTPATIENT)
Dept: FAMILY MEDICINE | Facility: CLINIC | Age: 22
End: 2017-12-04

## 2017-12-04 DIAGNOSIS — B37.31 CANDIDIASIS OF VULVA AND VAGINA: Primary | ICD-10-CM

## 2017-12-04 RX ORDER — FLUCONAZOLE 150 MG/1
150 TABLET ORAL
Qty: 2 TABLET | Refills: 0 | Status: SHIPPED | OUTPATIENT
Start: 2017-12-04 | End: 2019-06-06

## 2017-12-04 NOTE — TELEPHONE ENCOUNTER
Left message on VM Dr. Mullins sent the requested prescription to your pharmacy earlier today. Call back if questions.   Sudheer Hawkins RN

## 2017-12-04 NOTE — TELEPHONE ENCOUNTER
Pt calls as follow up 10/30/17 visit with  Dr. Susanna Simeon and treated for BV.   Recalls JM said to call if S&S vag yeast and he would treat (not found in visit note) over the phone.  Patient has No previous history of vag yeast.   One week vaginal discharge - kind of thick, white , itchiness, no odor. Just a lot of discharge   Denies urinary symptoms .    Informed visit was 5 weeks ago, advised OV (recheck wet prep).   Declined.   Expects tx for yeast over the phone.   Please advise.  Sudheer Hawikns, RN

## 2017-12-06 ENCOUNTER — THERAPY VISIT (OUTPATIENT)
Dept: PHYSICAL THERAPY | Facility: CLINIC | Age: 22
End: 2017-12-06
Payer: COMMERCIAL

## 2017-12-06 DIAGNOSIS — G89.29 CHRONIC LEFT-SIDED LOW BACK PAIN WITH LEFT-SIDED SCIATICA: ICD-10-CM

## 2017-12-06 DIAGNOSIS — M54.42 CHRONIC LEFT-SIDED LOW BACK PAIN WITH LEFT-SIDED SCIATICA: ICD-10-CM

## 2017-12-06 PROCEDURE — 97110 THERAPEUTIC EXERCISES: CPT | Mod: GP | Performed by: PHYSICAL THERAPIST

## 2017-12-06 PROCEDURE — 97140 MANUAL THERAPY 1/> REGIONS: CPT | Mod: GP | Performed by: PHYSICAL THERAPIST

## 2017-12-06 PROCEDURE — 97112 NEUROMUSCULAR REEDUCATION: CPT | Mod: GP | Performed by: PHYSICAL THERAPIST

## 2017-12-06 NOTE — MR AVS SNAPSHOT
After Visit Summary   12/6/2017    Rizwana Alford    MRN: [id number]           Patient Information     Date Of Birth          1995        Visit Information        Provider Department      12/6/2017 9:00 AM Rashid Crum, PT [facility]        Today's Diagnoses     Chronic left-sided low back pain with left-sided sciatica           Follow-ups after your visit        Your next 10 appointments already scheduled     Dec 13, 2017  9:00 AM CST   RAYRAY Spine with Rashid Crum PT   [facility] ([facility])    [address]  [facility] [id number] [phone]              Who to contact     If you have questions or need follow up information about today's clinic visit or your schedule please contact [facility] PHYSICAL THERAPY directly at [phone].  Normal or non-critical lab and imaging results will be communicated to you by Night Outhart, letter or phone within 4 business days after the clinic has received the results. If you do not hear from us within 7 days, please contact the clinic through Night Outhart or phone. If you have a critical or abnormal lab result, we will notify you by phone as soon as possible.  Submit refill requests through [employer] or call your pharmacy and they will forward the refill request to us. Please allow 3 business days for your refill to be completed.          Additional Information About Your Visit        MyChart Information     [employer] gives you secure access to your electronic health record. If you see a primary care provider, you can also send messages to your care team and make appointments. If you have questions, please call your primary care clinic.  If you do not have a primary care provider, please call [phone] and they will assist you.        Care EveryWhere ID     This is your Care EveryWhere ID.  This could be used by other organizations to access your [place] medical records  [id number]        Your Vitals Were     Last Period                   11/26/2017            Blood Pressure from Last 3 Encounters:   11/27/17 126/83   10/30/17 119/65   03/23/17 118/62    Weight from Last 3 Encounters:   11/27/17 68.5 kg (151 lb)   10/30/17 67.9 kg (149 lb 11.2 oz)   03/23/17 69.9 kg (154 lb)              We Performed the Following     MANUAL THER TECH,1+REGIONS,EA 15 MIN     NEUROMUSCULAR RE-EDUCATION     THERAPEUTIC EXERCISES        Primary Care Provider    Valdo Umana       No address on file        Equal Access to Services     [facility]: Hadii aad ku hadasho Soomaali, waaxda luqadaha, qaybta kaalmada adeegyada, rita carreon hayjane hernandez . So [facility] [phone].    ATENCIÓN: Si habla español, tiene a rojas disposición servicios gratuitos de asistencia lingüística. Llame al [phone].    We comply with applicable federal civil rights laws and [place] laws. We do not discriminate on the basis of race, color, national origin, age, disability, sex, sexual orientation, or gender identity.            Thank you!     Thank you for choosing [facility] PHYSICAL THERAP[facility]  for your care. Our goal is always to provide you with excellent care. Hearing back from our patients is one way we can continue to improve our services. Please take a few minutes to complete the written survey that you may receive in the mail after your visit with us. Thank you!             Your Updated Medication List - Protect others around you: Learn how to safely use, store and throw away your medicines at www.disposemymeds.org.          This list is accurate as of: 12/6/17  9:32 AM.  Always use your most recent med list.                   Brand Name Dispense Instructions for use Diagnosis    cyclobenzaprine 10 MG tablet    FLEXERIL    30 tablet    Take 0.5-1 tablets (5-10 mg) by mouth 3 times  daily as needed for muscle spasms    Bilateral low back pain without sciatica, unspecified chronicity       fluconazole 150 MG tablet    DIFLUCAN    2 tablet    Take 1 tablet (150 mg) by mouth every 3 days    Candidiasis of vulva and vagina       ORTHO TRI-CYCLEN (28) 0.18/0.215/0.25 MG-35 MCG per tablet   Generic drug:  norgestim-eth estrad triphasic

## 2017-12-22 ENCOUNTER — OFFICE VISIT (OUTPATIENT)
Dept: FAMILY MEDICINE | Facility: CLINIC | Age: 22
End: 2017-12-22
Payer: COMMERCIAL

## 2017-12-22 VITALS
TEMPERATURE: 97.9 F | DIASTOLIC BLOOD PRESSURE: 82 MMHG | HEIGHT: 66 IN | BODY MASS INDEX: 24.8 KG/M2 | WEIGHT: 154.3 LBS | HEART RATE: 73 BPM | RESPIRATION RATE: 12 BRPM | SYSTOLIC BLOOD PRESSURE: 123 MMHG | OXYGEN SATURATION: 96 %

## 2017-12-22 DIAGNOSIS — N89.8 VAGINAL DISCHARGE: Primary | ICD-10-CM

## 2017-12-22 DIAGNOSIS — Z11.3 SCREEN FOR STD (SEXUALLY TRANSMITTED DISEASE): ICD-10-CM

## 2017-12-22 LAB
SPECIMEN SOURCE: ABNORMAL
WET PREP SPEC: ABNORMAL

## 2017-12-22 PROCEDURE — 87389 HIV-1 AG W/HIV-1&-2 AB AG IA: CPT | Performed by: FAMILY MEDICINE

## 2017-12-22 PROCEDURE — 87491 CHLMYD TRACH DNA AMP PROBE: CPT | Performed by: FAMILY MEDICINE

## 2017-12-22 PROCEDURE — 36415 COLL VENOUS BLD VENIPUNCTURE: CPT | Performed by: FAMILY MEDICINE

## 2017-12-22 PROCEDURE — 87210 SMEAR WET MOUNT SALINE/INK: CPT | Performed by: FAMILY MEDICINE

## 2017-12-22 PROCEDURE — 86780 TREPONEMA PALLIDUM: CPT | Performed by: FAMILY MEDICINE

## 2017-12-22 PROCEDURE — 87591 N.GONORRHOEAE DNA AMP PROB: CPT | Performed by: FAMILY MEDICINE

## 2017-12-22 PROCEDURE — 99213 OFFICE O/P EST LOW 20 MIN: CPT | Performed by: FAMILY MEDICINE

## 2017-12-22 RX ORDER — METRONIDAZOLE 7.5 MG/G
1 GEL VAGINAL 2 TIMES DAILY
Qty: 70 G | Refills: 0 | Status: SHIPPED | OUTPATIENT
Start: 2017-12-22 | End: 2017-12-27

## 2017-12-22 ASSESSMENT — ANXIETY QUESTIONNAIRES
2. NOT BEING ABLE TO STOP OR CONTROL WORRYING: NOT AT ALL
1. FEELING NERVOUS, ANXIOUS, OR ON EDGE: NOT AT ALL
6. BECOMING EASILY ANNOYED OR IRRITABLE: NOT AT ALL
GAD7 TOTAL SCORE: 0
IF YOU CHECKED OFF ANY PROBLEMS ON THIS QUESTIONNAIRE, HOW DIFFICULT HAVE THESE PROBLEMS MADE IT FOR YOU TO DO YOUR WORK, TAKE CARE OF THINGS AT HOME, OR GET ALONG WITH OTHER PEOPLE: NOT DIFFICULT AT ALL
7. FEELING AFRAID AS IF SOMETHING AWFUL MIGHT HAPPEN: NOT AT ALL
5. BEING SO RESTLESS THAT IT IS HARD TO SIT STILL: NOT AT ALL
3. WORRYING TOO MUCH ABOUT DIFFERENT THINGS: NOT AT ALL

## 2017-12-22 ASSESSMENT — PATIENT HEALTH QUESTIONNAIRE - PHQ9
5. POOR APPETITE OR OVEREATING: NOT AT ALL
SUM OF ALL RESPONSES TO PHQ QUESTIONS 1-9: 0

## 2017-12-22 NOTE — MR AVS SNAPSHOT
"              After Visit Summary   12/22/2017    Rizwana Alford    MRN: [id number]           Patient Information     Date Of Birth          1995        Visit Information        Provider Department      12/22/2017 3:15 PM Kendell Mullins MD [facility]        Today's Diagnoses     Vaginal discharge    -  1    Screen for STD (sexually transmitted disease)           Follow-ups after your visit        Who to contact     If you have questions or need follow up information about today's clinic visit or your schedule please contact [facility] directly at [phone].  Normal or non-critical lab and imaging results will be communicated to you by NuMediihart, letter or phone within 4 business days after the clinic has received the results. If you do not hear from us within 7 days, please contact the clinic through NuMediihart or phone. If you have a critical or abnormal lab result, we will notify you by phone as soon as possible.  Submit refill requests through [employer] or call your pharmacy and they will forward the refill request to us. Please allow 3 business days for your refill to be completed.          Additional Information About Your Visit        MyChart Information     [employer] gives you secure access to your electronic health record. If you see a primary care provider, you can also send messages to your care team and make appointments. If you have questions, please call your primary care clinic.  If you do not have a primary care provider, please call [phone] and they will assist you.        Care EveryWhere ID     This is your Care EveryWhere ID. This could be used by other organizations to access your [facility] medical records  [id number]        Your Vitals Were     Pulse Temperature Respirations Height Last Period Pulse Oximetry    73 97.9  F (36.6  C) (Oral) 12 5' 6\" (1.676 m) 11/26/2017 96%    BMI (Body Mass Index)                   24.9 kg/m2            Blood " Pressure from Last 3 Encounters:   12/22/17 123/82   11/27/17 126/83   10/30/17 119/65    Weight from Last 3 Encounters:   12/22/17 154 lb 4.8 oz (70 kg)   11/27/17 151 lb (68.5 kg)   10/30/17 149 lb 11.2 oz (67.9 kg)              We Performed the Following     Anti Treponema     CHLAMYDIA TRACHOMATIS PCR     HIV Antigen Antibody Combo     NEISSERIA GONORRHOEA PCR     Wet prep          Today's Medication Changes          These changes are accurate as of: 12/22/17  4:23 PM.  If you have any questions, ask your nurse or doctor.               Start taking these medicines.        Dose/Directions    metroNIDAZOLE 0.75 % vaginal gel   Commonly known as:  METROGEL   Used for:  Vaginal discharge   Started by:  Kendell Mullins MD        Dose:  1 applicator   Place 1 applicator (5 g) vaginally 2 times daily for 5 days   Quantity:  70 g   Refills:  0            Where to get your medicines      These medications were sent to [place] [address] [place] [address] [address]     Phone:  [phone]     metroNIDAZOLE 0.75 % vaginal gel                Primary Care Provider    Valdo Umana       No address on file        Equal Access to Services     MARK PARISI AH: Hadii bridger macko Sojonathan, waaxda luqadaha, qaybta kaalmada adeegyada, rita finn. So [facility] [phone].    ATENCIÓN: Si habla español, tiene a rojas disposición servicios gratuitos de asistencia lingüística. Jarod al [phone].    We comply with applicable federal civil rights laws and [place] laws. We do not discriminate on the basis of race, color, national origin, age, disability, sex, sexual orientation, or gender identity.            Thank you!     Thank you for choosing [facility]  for your care. Our goal is always to provide you with excellent care. Hearing back from our patients is one way we can continue to improve our services. Please take  a few minutes to complete the written survey that you may receive in the mail after your visit with us. Thank you!             Your Updated Medication List - Protect others around you: Learn how to safely use, store and throw away your medicines at www.disposemymeds.org.          This list is accurate as of: 12/22/17  4:23 PM.  Always use your most recent med list.                   Brand Name Dispense Instructions for use Diagnosis    cyclobenzaprine 10 MG tablet    FLEXERIL    30 tablet    Take 0.5-1 tablets (5-10 mg) by mouth 3 times daily as needed for muscle spasms    Bilateral low back pain without sciatica, unspecified chronicity       fluconazole 150 MG tablet    DIFLUCAN    2 tablet    Take 1 tablet (150 mg) by mouth every 3 days    Candidiasis of vulva and vagina       metroNIDAZOLE 0.75 % vaginal gel    METROGEL    70 g    Place 1 applicator (5 g) vaginally 2 times daily for 5 days    Vaginal discharge       ORTHO TRI-CYCLEN (28) 0.18/0.215/0.25 MG-35 MCG per tablet   Generic drug:  norgestim-eth estrad triphasic

## 2017-12-22 NOTE — PROGRESS NOTES
SUBJECTIVE:   Rizwana Alford is a 22 year old female who presents to clinic today for the following health issues:      Vaginal Symptoms      Duration: on and off since 10/30/17 the last visit with Dr. Mullins    Description  vaginal discharge - yellow, itching and odor    Intensity:  none    Accompanying signs and symptoms (fever/dysuria/abdominal or back pain): None    History  Sexually active: not at present  Possibility of pregnancy: No  Recent antibiotic use: YES    Precipitating or alleviating factors: None    Therapies tried and outcome: Metronidazole cream   Outcome: there were no side effects like the pills    No pain but has foul odor:  Wet prep is trich and NSV positive  Reviewed her past three months of episodes  She requests std screen     Past Medical History:   Diagnosis Date     Ovarian cysts        Past Surgical History:   Procedure Laterality Date     APPENDECTOMY       CYSTECTOMY OVARIAN BENIGN       TONSILLECTOMY         Family History   Problem Relation Age of Onset     Colon Cancer Paternal Grandfather        Social History   Substance Use Topics     Smoking status: Former Smoker     Smokeless tobacco: Never Used     Alcohol use No        REVIEW OF SYSTEMS    Generally has been otherwise  feeling well until this episode. No problems with vision, hearing, dental or neck pain.Has no cramping, has  airborne or ingestion allergy  No chest pain, palpitations, dyspnea, change in bowel habits, blood  in stool or dyspepsia.  No rashes, changing moles, weakness, lassitude or back problems.  No chronic issues . No dysuria  Patient not  a smoker. No problems with significant headaches.    On exam the vital signs are stable  Weight is Body mass index is 24.9 kg/(m^2).   Eyes show edna  No neck masses or thyromegaly.Ear nose and throat shows normal   No bruits, murmers, rubs or extrasounds. No cardiomegaly or chest wall tenderness. Lungs clear, no abdominal masses or organomegaly. No CVA  tenderness.  Skin eval no rash   No hernias, good range of motion neck, back and extremities. No abnormal skin lesions. Normal genitalia. Good peripheral pulses. No adenopathy.  Normal gait and stance. Neck is supple.      (N89.8) Vaginal discharge  (primary encounter diagnosis)  Comment:   Plan: NEISSERIA GONORRHOEA PCR, CHLAMYDIA TRACHOMATIS        PCR, Wet prep, Anti Treponema, HIV Antigen         Antibody Combo, metroNIDAZOLE (METROGEL) 0.75 %        vaginal gel            (Z11.3) Screen for STD (sexually transmitted disease)  Comment:   Plan: discussed recurrent discharge, results of testing, role of barrier contraception in prevention   Outlined various std symptoms and signs and presentations

## 2017-12-23 ASSESSMENT — ANXIETY QUESTIONNAIRES: GAD7 TOTAL SCORE: 0

## 2017-12-24 LAB
C TRACH DNA SPEC QL NAA+PROBE: NEGATIVE
N GONORRHOEA DNA SPEC QL NAA+PROBE: NEGATIVE
SPECIMEN SOURCE: NORMAL
SPECIMEN SOURCE: NORMAL
T PALLIDUM IGG+IGM SER QL: NEGATIVE

## 2017-12-26 LAB — HIV 1+2 AB+HIV1 P24 AG SERPL QL IA: NONREACTIVE

## 2018-01-03 NOTE — PROGRESS NOTES
Discharge Note    Progress reporting period is from initial eval to Dec 6, 2017.     Rizwana failed to return for next follow up visit and current status is unknown.  Please see information below for last relevant information on current status.  Patient seen for Rxs Used: 2 visits.  SUBJECTIVE  Subjective changes noted by patient:  Subjective: Little better but still occ shooting pain more localized to Lx vs buttocks  .  Current pain level is Current Pain level: 3/10.     Previous pain level was  Initial Pain level: 3/10.   Changes in function:  Yes (See Goal flowsheet attached for changes in current functional level)  Adverse reaction to treatment or activity: None    OBJECTIVE  Changes noted in objective findings: Objective: press up pain during increased ROM after     ASSESSMENT/PLAN  Diagnosis: LBP   DIAGP:  The encounter diagnosis was Chronic left-sided low back pain with left-sided sciatica.  Updated problem list and treatment plan:   Pain - HEP  Decreased ROM/flexibility - HEP  Decreased function - HEP  Decreased strength - HEP  Impaired posture - HEP  STG/LTGs have been met or progress has been made towards goals:  Yes, please see goal flowsheet for most current information  Assessment of Progress: current status is unknown.  Last current status: Assessment of progress: Pt is progressing as expected   Self Management Plans:  HEP  I have re-evaluated this patient and find that the nature, scope, duration and intensity of the therapy is appropriate for the medical condition of the patient.  Rizwana continues to require the following intervention to meet STG and LTG's:  HEP.    Recommendations:  Discharge with current home program.  Patient to follow up with MD as needed.    Please refer to the daily flowsheet for treatment today, total treatment time and time spent performing 1:1 timed codes.

## 2019-06-06 ENCOUNTER — OFFICE VISIT (OUTPATIENT)
Dept: FAMILY MEDICINE | Facility: CLINIC | Age: 24
End: 2019-06-06
Payer: COMMERCIAL

## 2019-06-06 VITALS
TEMPERATURE: 98.5 F | RESPIRATION RATE: 12 BRPM | WEIGHT: 188 LBS | SYSTOLIC BLOOD PRESSURE: 102 MMHG | BODY MASS INDEX: 30.34 KG/M2 | DIASTOLIC BLOOD PRESSURE: 50 MMHG | HEART RATE: 72 BPM

## 2019-06-06 DIAGNOSIS — R11.0 NAUSEA: ICD-10-CM

## 2019-06-06 DIAGNOSIS — N76.0 BV (BACTERIAL VAGINOSIS): ICD-10-CM

## 2019-06-06 DIAGNOSIS — B96.89 BV (BACTERIAL VAGINOSIS): ICD-10-CM

## 2019-06-06 DIAGNOSIS — N89.8 VAGINAL DISCHARGE: Primary | ICD-10-CM

## 2019-06-06 DIAGNOSIS — A59.9 TRICHIMONIASIS: ICD-10-CM

## 2019-06-06 LAB
SPECIMEN SOURCE: ABNORMAL
WET PREP SPEC: ABNORMAL

## 2019-06-06 PROCEDURE — 87210 SMEAR WET MOUNT SALINE/INK: CPT | Performed by: PHYSICIAN ASSISTANT

## 2019-06-06 PROCEDURE — 99213 OFFICE O/P EST LOW 20 MIN: CPT | Performed by: PHYSICIAN ASSISTANT

## 2019-06-06 RX ORDER — METRONIDAZOLE 500 MG/1
500 TABLET ORAL 2 TIMES DAILY
Qty: 14 TABLET | Refills: 0 | Status: SHIPPED | OUTPATIENT
Start: 2019-06-06 | End: 2019-08-21

## 2019-06-06 RX ORDER — ONDANSETRON 4 MG/1
4 TABLET, FILM COATED ORAL EVERY 12 HOURS PRN
Qty: 16 TABLET | Refills: 0 | Status: SHIPPED | OUTPATIENT
Start: 2019-06-06 | End: 2019-08-21

## 2019-06-06 NOTE — PROGRESS NOTES
Subjective     Rizwana Alford is a 23 year old female who presents to clinic today for the following health issues:    HPI   Vaginal Symptoms  Onset: 2 days    Description:  Vaginal Discharge: white   Itching (Pruritis): YES  Burning sensation:  no   Odor: no     Accompanying Signs & Symptoms:  Pain with Urination: no   Abdominal Pain: no   Fever: no     History:   Sexually active: YES  New Partner: YES  Possibility of Pregnancy:  No    Precipitating factors:   Recent Antibiotic Use: no     Alleviating factors:  none    Therapies Tried and outcome: none      Patient Active Problem List   Diagnosis     Ovarian cysts     Chronic left-sided low back pain with left-sided sciatica     Past Surgical History:   Procedure Laterality Date     APPENDECTOMY       CYSTECTOMY OVARIAN BENIGN       TONSILLECTOMY         Social History     Tobacco Use     Smoking status: Former Smoker     Smokeless tobacco: Never Used   Substance Use Topics     Alcohol use: No     Alcohol/week: 0.0 oz     Family History   Problem Relation Age of Onset     Colon Cancer Paternal Grandfather              Reviewed and updated as needed this visit by Provider         Review of Systems   ROS COMP: Constitutional, HEENT, cardiovascular, pulmonary, gi and gu systems are negative, except as otherwise noted.      Objective    There were no vitals taken for this visit.  There is no height or weight on file to calculate BMI.  Physical Exam   GENERAL: healthy, alert and no distress  NECK: no adenopathy, no asymmetry, masses, or scars and thyroid normal to palpation  RESP: lungs clear to auscultation - no rales, rhonchi or wheezes  CV: regular rate and rhythm, normal S1 S2, no S3 or S4, no murmur, click or rub, no peripheral edema and peripheral pulses strong  ABDOMEN: soft, nontender, no hepatosplenomegaly, no masses and bowel sounds normal   (female): deferred.  Patient declined  MS: no gross musculoskeletal defects noted, no edema    Diagnostic Test  Results:  Results for orders placed or performed in visit on 06/06/19 (from the past 24 hour(s))   Wet prep   Result Value Ref Range    Specimen Description Vagina     Wet Prep Moderate  Trichomonas seen   (A)     Wet Prep Few  Clue cells seen   (A)     Wet Prep No yeast seen     Wet Prep Moderate  WBC'S seen              Assessment & Plan     (N89.8) Vaginal discharge  (primary encounter diagnosis)  Plan: Wet prep            (N76.0,  B96.89) BV (bacterial vaginosis)  Comment: Continue medication as advised .   Medication might maintain q.-nauseous and constipated with metallic taste in the mouth.  -Zofran ordered as preventive measure for nausea and vomiting.  Plan: metroNIDAZOLE (FLAGYL) 500 MG tablet            (A59.9) Trichimoniasis  Comment: Lab results have been discussed with the patient.  Patient to come back for lab only visit for gonorrhea chlamydia screen.  Partner needs to be treated.  Plan: metroNIDAZOLE (FLAGYL) 500 MG tablet          (R11.0) Nausea  Comment: Patient has past history of severe nausea and vomiting from metronidazole..  Zofran has been prescribed as a preventive measure to help her with nausea and vomiting  Plan: ondansetron (ZOFRAN) 4 MG tablet                No follow-ups on file.    Rafaela Bah PA-C  [facility]

## 2019-06-06 NOTE — PATIENT INSTRUCTIONS
1.  Partner needs to be treated for trichomonas  2.  If you have nausea and vomiting please take Zofran as advised.  3.  Do not drink alcohol with the prescribed medication.  4.  Please visit lab for gonorrhea chlamydia screening.  Patient Education     Bacterial Vaginosis    You have a vaginal infection called bacterial vaginosis (BV). Both good and bad bacteria are present in a healthy vagina. BV occurs when these bacteria get out of balance. The number of bad bacteria increase. And the number of good bacteria decrease. Although BV is associated with sexual activity, it is not a sexually transmitted disease.  BV may or may not cause symptoms. If symptoms do occur, they can include:    Thin, gray, milky-white, or sometimes green discharge    Unpleasant odor or  fishy  smell    Itching, burning, or pain in or around the vagina  It is not known what causes BV, but certain factors can make the problem more likely. This can include:    Douching    Having sex with a new partner    Having sex with more than one partner  BV will sometimes go away on its own. But treatment is usually recommended. This is because untreated BV can increase the risk of more serious health problems such as:    Pelvic inflammatory disease (PID)     delivery (giving birth to a baby early if you re pregnant)    HIV and certain other sexually transmitted diseases (STDs)    Infection after surgery on the reproductive organs  Home care  General care    BV is most often treated with medicines called antibiotics. These may be given as pills or as a vaginal cream. If antibiotics are prescribed, be sure to use them exactly as directed. Also, be sure to complete all of the medicine, even if your symptoms go away.    Don't douche or having sex during treatment.    If you have sex with a female partner, ask your healthcare provider if she should also be treated.  Prevention    Don't douche.    Don't have sex. If you do have sex, then take steps to  lower your risk:  ? Use condoms when having sex.  ? Limit the number of sexual partners you have.  Follow-up care  Follow up with your healthcare provider, or as advised.  When to seek medical advice  Call your healthcare provider right away if:    You have a fever of 100.4 F (38 C) or higher, or as directed by your provider.    Your symptoms worsen, or they don t go away within a few days of starting treatment.    You have new pain in the lower belly or pelvic region.    You have side effects that bother you or a reaction to the pills or cream you re prescribed.    You or any partners you have sex with have new symptoms, such as a rash, joint pain, or sores.  Date Last Reviewed: 10/1/2017    [id number] [employer]. [address] [place] [address], [place] [address]. All rights reserved. This information is not intended as a substitute for professional medical care. Always follow your healthcare professional's instructions.           Patient Education     Trichomonas Vaginal Infection (Trichomoniasis)    Trichomonas vaginal infection is often called  trich.  It is caused by a parasite that is passed during sex. This makes trich a sexually transmitted disease (STD). Both men and women can get trich, but it is more common in women.  Most people who have trich don t have any symptoms at first. If symptoms do occur, they may take weeks or months to develop.  Symptoms in women can include:    Thin discharge from the vagina that may smell bad and be clear, white, gray, green, or yellow in color    Itching, burning, redness, or soreness in or around the vagina    Pain in the lower belly    Frequent urination or pain and burning during urination    Pain during sex  Symptoms in men are not very common. Men may have trich and pass it to women during sex without knowing they were ever infected.  Trich is most often treated with antibiotics. Without treatment, trich can increase the risk of more serious health problems such  as:    Pelvic inflammatory disease (PID)     delivery (giving birth to a baby early if you re pregnant)    HIV and certain other sexually transmitted diseases (STDs)  Home care    Take the antibiotics you re prescribed exactly as directed. Finish all of the medicine, even if your symptoms go away.    Avoid drinking alcohol until you re done with your treatment.    Tell any partners you have sex with that you have trich. They will need be tested for trich and possibly treated as well.    Avoid having sex until you and any partners you have sex with are confirmed to no longer have trich.  Prevention  The only way to avoid getting trich or any other STD is to avoid having sex. If you choose to have sex, then take steps to lower your health risks:    Use condoms when having sex.    Limit the number of partners you have sex with.    Get tested regularly for STDs. Ask any partner you have sex with to do the same.    Don t have sex with anyone who has symptoms that may be due to an STD.  Follow-up care  Follow up with your healthcare provider, or as advised. Testing will likely be done to ensure that the infection has cleared.  When to seek medical advice  Call your healthcare provider right away if:    You have a fever of 100.4 F (38 C) or higher, or as directed by your provider.    Your symptoms worsen, or they don t go away even after completing your treatment.    You have new pain in the lower belly or pelvic region.    You have side effects that bother you or a reaction to the medicine you re taking.    You or any partners you have sex with have new symptoms, such as a rash, joint pain, or sores.  Date Last Reviewed: 10/1/2017    [id number] [employer]. [address] [place] [address], [place] [address]. All rights reserved. This information is not intended as a substitute for professional medical care. Always follow your healthcare professional's instructions.

## 2019-08-21 ENCOUNTER — OFFICE VISIT (OUTPATIENT)
Dept: FAMILY MEDICINE | Facility: CLINIC | Age: 24
End: 2019-08-21
Payer: COMMERCIAL

## 2019-08-21 VITALS
OXYGEN SATURATION: 98 % | HEART RATE: 84 BPM | RESPIRATION RATE: 12 BRPM | SYSTOLIC BLOOD PRESSURE: 112 MMHG | BODY MASS INDEX: 30.34 KG/M2 | TEMPERATURE: 97.8 F | WEIGHT: 188 LBS | DIASTOLIC BLOOD PRESSURE: 82 MMHG

## 2019-08-21 DIAGNOSIS — Z11.3 SCREENING EXAMINATION FOR STD (SEXUALLY TRANSMITTED DISEASE): Primary | ICD-10-CM

## 2019-08-21 DIAGNOSIS — N76.0 BV (BACTERIAL VAGINOSIS): ICD-10-CM

## 2019-08-21 DIAGNOSIS — B96.89 BV (BACTERIAL VAGINOSIS): ICD-10-CM

## 2019-08-21 DIAGNOSIS — Z86.59 HISTORY OF ATTENTION DEFICIT DISORDER: ICD-10-CM

## 2019-08-21 LAB
SPECIMEN SOURCE: ABNORMAL
WET PREP SPEC: ABNORMAL

## 2019-08-21 PROCEDURE — 87591 N.GONORRHOEAE DNA AMP PROB: CPT | Performed by: PHYSICIAN ASSISTANT

## 2019-08-21 PROCEDURE — 87389 HIV-1 AG W/HIV-1&-2 AB AG IA: CPT | Performed by: PHYSICIAN ASSISTANT

## 2019-08-21 PROCEDURE — 86780 TREPONEMA PALLIDUM: CPT | Performed by: PHYSICIAN ASSISTANT

## 2019-08-21 PROCEDURE — 87210 SMEAR WET MOUNT SALINE/INK: CPT | Performed by: PHYSICIAN ASSISTANT

## 2019-08-21 PROCEDURE — 36415 COLL VENOUS BLD VENIPUNCTURE: CPT | Performed by: PHYSICIAN ASSISTANT

## 2019-08-21 PROCEDURE — 87491 CHLMYD TRACH DNA AMP PROBE: CPT | Performed by: PHYSICIAN ASSISTANT

## 2019-08-21 PROCEDURE — 99213 OFFICE O/P EST LOW 20 MIN: CPT | Performed by: PHYSICIAN ASSISTANT

## 2019-08-21 RX ORDER — FLUCONAZOLE 150 MG/1
150 TABLET ORAL ONCE
Qty: 1 TABLET | Refills: 0 | Status: SHIPPED | OUTPATIENT
Start: 2019-08-21 | End: 2019-08-21

## 2019-08-21 RX ORDER — METRONIDAZOLE 7.5 MG/G
1 GEL VAGINAL DAILY
Qty: 35 G | Refills: 0 | Status: SHIPPED | OUTPATIENT
Start: 2019-08-21 | End: 2019-08-28

## 2019-08-21 NOTE — PATIENT INSTRUCTIONS

## 2019-08-21 NOTE — PROGRESS NOTES
Subjective     Rizwana Alford is a 24 year old female who presents to clinic today for the following health issues:    HPI   Vaginal Symptoms  Onset: since June OV    Description:  Vaginal Discharge: white   Itching (Pruritis): no   Burning sensation:  no   Odor: YES    Accompanying Signs & Symptoms:  Pain with Urination: no   Abdominal Pain: no  Fever: no     History:   Sexually active: 1 partner- none currently  New Partner no   Possibility of Pregnancy:  No    Precipitating factors:   Recent Antibiotic Use: no     Alleviating factors:  Has not taken the Flagyl-unable to take med gets ill- she tested positive for BV and trichomonas- never came back for other STD testing    Therapies Tried and outcome: none      Patient Active Problem List   Diagnosis     Ovarian cysts     Chronic left-sided low back pain with left-sided sciatica     Past Surgical History:   Procedure Laterality Date     APPENDECTOMY       CYSTECTOMY OVARIAN BENIGN       TONSILLECTOMY         Social History     Tobacco Use     Smoking status: Former Smoker     Smokeless tobacco: Never Used   Substance Use Topics     Alcohol use: No     Alcohol/week: 0.0 oz     Family History   Problem Relation Age of Onset     Colon Cancer Paternal Grandfather          Current Outpatient Medications   Medication Sig Dispense Refill     fluconazole (DIFLUCAN) 150 MG tablet Take 1 tablet (150 mg) by mouth once for 1 dose 1 tablet 0     metroNIDAZOLE (METROGEL) 0.75 % vaginal gel Place 1 applicator (5 g) vaginally daily for 7 days 35 g 0     No Known Allergies      Reviewed and updated as needed this visit by Provider         Review of Systems   ROS COMP: Constitutional, HEENT, cardiovascular, pulmonary, gi and gu systems are negative, except as otherwise noted.      Objective    /82 (BP Location: Right arm, Patient Position: Chair, Cuff Size: Adult Large)   Pulse 84   Temp 97.8  F (36.6  C) (Oral)   Resp 12   Wt 85.3 kg (188 lb)   SpO2 98%   BMI 30.34  kg/m    Body mass index is 30.34 kg/m .         Physical Exam   GENERAL: healthy, alert and no distress  EYES: Eyes grossly normal to inspection, PERRL and conjunctivae and sclerae normal  RESP: lungs clear to auscultation - no rales, rhonchi or wheezes  CV: regular rate and rhythm, normal S1 S2, no S3 or S4, no murmur, click or rub, no peripheral edema and peripheral pulses strong  ABDOMEN: soft, nontender, no hepatosplenomegaly, no masses and bowel sounds normal  MS: no gross musculoskeletal defects noted, no edema  SKIN: no suspicious lesions or rashes  NEURO: Normal strength and tone, mentation intact and speech normal  BACK: no CVA tenderness  PSYCH: mentation appears normal, affect normal/bright    Diagnostic Test Results:  Results for orders placed or performed in visit on 08/21/19 (from the past 24 hour(s))   Wet prep   Result Value Ref Range    Specimen Description Vagina     Wet Prep Clue cells seen (A)     Wet Prep No Trichomonas seen     Wet Prep No yeast seen     Wet Prep Rare  WBC'S seen              Assessment & Plan     (Z11.3) Screening examination for STD (sexually transmitted disease)  (primary encounter diagnosis)    Comment: [facility]    Plan: Wet prep, Chlamydia trachomatis PCR, Neisseria         gonorrhoeae PCR, Treponema Abs w Reflex to RPR         and Titer, HIV Antigen Antibody Combo            (N76.0,  B96.89) BV (bacterial vaginosis)    Comment: Treat with metrogel. Diflucan given in case she gets a yeast infection.    Plan: metroNIDAZOLE (METROGEL) 0.75 % vaginal gel,         fluconazole (DIFLUCAN) 150 MG tablet            (Z86.59) History of attention deficit disorder    Comment: She would like a referral to psychiatry. She had ADD as a child and took adderall. Thinks it could be helpful again.    Plan: MENTAL HEALTH REFERRAL  - Adult; Assessments         and Testing; ADHD; [facility] [place] [facility] [phone]; We will contact  you to         schedule the appointment or please call with         any questions                 Patient Instructions     Patient Education     Bacterial Vaginosis    You have a vaginal infection called bacterial vaginosis (BV). Both good and bad bacteria are present in a healthy vagina. BV occurs when these bacteria get out of balance. The number of bad bacteria increase. And the number of good bacteria decrease. Although BV is associated with sexual activity, it is not a sexually transmitted disease.  BV may or may not cause symptoms. If symptoms do occur, they can include:    Thin, gray, milky-white, or sometimes green discharge    Unpleasant odor or  fishy  smell    Itching, burning, or pain in or around the vagina  It is not known what causes BV, but certain factors can make the problem more likely. This can include:    Douching    Having sex with a new partner    Having sex with more than one partner  BV will sometimes go away on its own. But treatment is usually recommended. This is because untreated BV can increase the risk of more serious health problems such as:    Pelvic inflammatory disease (PID)     delivery (giving birth to a baby early if you re pregnant)    HIV and certain other sexually transmitted diseases (STDs)    Infection after surgery on the reproductive organs  Home care  General care    BV is most often treated with medicines called antibiotics. These may be given as pills or as a vaginal cream. If antibiotics are prescribed, be sure to use them exactly as directed. Also, be sure to complete all of the medicine, even if your symptoms go away.    Don't douche or having sex during treatment.    If you have sex with a female partner, ask your healthcare provider if she should also be treated.  Prevention    Don't douche.    Don't have sex. If you do have sex, then take steps to lower your risk:  ? Use condoms when having sex.  ? Limit the number of sexual partners you have.  Follow-up  care  Follow up with your healthcare provider, or as advised.  When to seek medical advice  Call your healthcare provider right away if:    You have a fever of 100.4 F (38 C) or higher, or as directed by your provider.    Your symptoms worsen, or they don t go away within a few days of starting treatment.    You have new pain in the lower belly or pelvic region.    You have side effects that bother you or a reaction to the pills or cream you re prescribed.    You or any partners you have sex with have new symptoms, such as a rash, joint pain, or sores.  Date Last Reviewed: 10/1/2017    8071-2362 [employer]. [address] [place] [address]. All rights reserved. This information is not intended as a substitute for professional medical care. Always follow your healthcare professional's instructions.               No follow-ups on file.    Minh Leyva PA-C  [facility]

## 2019-08-22 ENCOUNTER — TELEPHONE (OUTPATIENT)
Dept: FAMILY MEDICINE | Facility: CLINIC | Age: 24
End: 2019-08-22

## 2019-08-22 DIAGNOSIS — A74.9 CHLAMYDIA INFECTION: Primary | ICD-10-CM

## 2019-08-22 LAB
C TRACH DNA SPEC QL NAA+PROBE: POSITIVE
HIV 1+2 AB+HIV1 P24 AG SERPL QL IA: NONREACTIVE
N GONORRHOEA DNA SPEC QL NAA+PROBE: NEGATIVE
SPECIMEN SOURCE: ABNORMAL
SPECIMEN SOURCE: NORMAL
T PALLIDUM AB SER QL: NONREACTIVE

## 2019-08-22 NOTE — TELEPHONE ENCOUNTER
Per WILFRED Silveira, treat with zithromax 1 gram PO X 1.   Recheck vag swab lab only 3 weeks    No answer, vm full. Unable to leave message.   Anghamit message sent requesting pt call clinic.      Rx t'd up and future lab only 3 weeks vag swab.  RN sign when patient informed and agrees.   Message handled by Nurse Triage with [employer] - provider name: Raoul Payne PA-C.  Sudheer Hawkins RN

## 2019-08-22 NOTE — TELEPHONE ENCOUNTER
Lab staff informs RN patient is positive for chlamydia. Minh, please advise.   Sudheer Hawkins RN

## 2019-08-23 RX ORDER — AZITHROMYCIN 500 MG/1
1000 TABLET, FILM COATED ORAL DAILY
Qty: 2 TABLET | Refills: 0 | Status: SHIPPED | OUTPATIENT
Start: 2019-08-23 | End: 2019-08-24

## 2019-08-23 NOTE — TELEPHONE ENCOUNTER
Pt informed and agrees to plan. Rx sent. Future lab only order and appointment scheduled.   Informed syphilis, gonorrhea, and HIV negative.  Chlamydia disease report form completed, faxed to [place] [facility], documented in clinic log, and form forwarded to abstracting.   Sudheer Hawkins RN

## 2019-09-29 ENCOUNTER — HEALTH MAINTENANCE LETTER (OUTPATIENT)
Age: 24
End: 2019-09-29

## 2019-11-11 ENCOUNTER — TELEPHONE (OUTPATIENT)
Dept: FAMILY MEDICINE | Facility: CLINIC | Age: 24
End: 2019-11-11

## 2019-11-11 NOTE — LETTER
[facility]  [id number] [place] [id number] [phone]  November 18, 2019    Rizwana Alford  [address] [facility] [address]    Dear Rizwana,    I care about your health and have reviewed your health plan. I have reviewed your medical conditions, medication list, and lab results and am making recommendations based on this review, to better manage your health.    I am recommending that you:schedule a PAP SMEAR EXAM which is due.  Please disregard this reminder if you have had this exam elsewhere within the last year.  It would be helpful for us to have a copy of your recent pap smear report in our file so that we can best coordinate your care.      H  Please call us at [phone] (or use [employer]) to address the above recommendations.     Thank you for trusting [facility] and we appreciate the opportunity to serve you.  We look forward to supporting your healthcare needs in the future.    Healthy Regards,    Kendell Mullins MD/ SARABJIT

## 2019-11-12 NOTE — TELEPHONE ENCOUNTER
Panel Management Review      Patient has the following on her problem list: None      Composite cancer screening  Chart review shows that this patient is due/due soon for the following Pap Smear  Summary:    Patient is due/failing the following:   PAP    Action needed:   Patient needs office visit for Pap.    Type of outreach:    Phone, left message for patient to call back.     Questions for provider review:    None                                                                                                                                    Shahnaz Ford MA  [facility].         Chart routed to Care Team .

## 2019-11-18 NOTE — TELEPHONE ENCOUNTER
Called the pt no response and no voice mail to leave so letter sent.  Shahnaz Ford MA  [facility].

## 2020-04-28 ENCOUNTER — TELEPHONE (OUTPATIENT)
Dept: FAMILY MEDICINE | Facility: CLINIC | Age: 25
End: 2020-04-28

## 2020-04-28 NOTE — TELEPHONE ENCOUNTER
Rizwana is wonder what she needs to do.  She has thrown up about 10 times since 7am.    She is not sure if she should go to the ER or UC.  She does not have a fever.

## 2020-04-29 NOTE — TELEPHONE ENCOUNTER
No answer LM to call us back if she is still having problems.  Also left a my chart msg.  Daisy Cortes RN

## 2020-06-26 ENCOUNTER — OFFICE VISIT (OUTPATIENT)
Dept: FAMILY MEDICINE | Facility: CLINIC | Age: 25
End: 2020-06-26
Payer: COMMERCIAL

## 2020-06-26 ENCOUNTER — ANCILLARY PROCEDURE (OUTPATIENT)
Dept: GENERAL RADIOLOGY | Facility: CLINIC | Age: 25
End: 2020-06-26
Attending: FAMILY MEDICINE
Payer: COMMERCIAL

## 2020-06-26 VITALS
TEMPERATURE: 98.4 F | HEART RATE: 63 BPM | BODY MASS INDEX: 36.96 KG/M2 | RESPIRATION RATE: 20 BRPM | OXYGEN SATURATION: 97 % | SYSTOLIC BLOOD PRESSURE: 110 MMHG | WEIGHT: 229 LBS | DIASTOLIC BLOOD PRESSURE: 70 MMHG

## 2020-06-26 DIAGNOSIS — M65.4 DE QUERVAIN'S DISEASE (TENOSYNOVITIS): Primary | ICD-10-CM

## 2020-06-26 PROCEDURE — 99213 OFFICE O/P EST LOW 20 MIN: CPT | Performed by: FAMILY MEDICINE

## 2020-06-26 PROCEDURE — 73110 X-RAY EXAM OF WRIST: CPT | Mod: LT

## 2020-06-26 RX ORDER — CELECOXIB 200 MG/1
200 CAPSULE ORAL DAILY
Qty: 30 CAPSULE | Refills: 1 | Status: SHIPPED | OUTPATIENT
Start: 2020-06-26 | End: 2021-05-05

## 2020-06-26 ASSESSMENT — PATIENT HEALTH QUESTIONNAIRE - PHQ9
SUM OF ALL RESPONSES TO PHQ QUESTIONS 1-9: 0
SUM OF ALL RESPONSES TO PHQ QUESTIONS 1-9: 0
10. IF YOU CHECKED OFF ANY PROBLEMS, HOW DIFFICULT HAVE THESE PROBLEMS MADE IT FOR YOU TO DO YOUR WORK, TAKE CARE OF THINGS AT HOME, OR GET ALONG WITH OTHER PEOPLE: NOT DIFFICULT AT ALL

## 2020-06-26 ASSESSMENT — ANXIETY QUESTIONNAIRES
GAD7 TOTAL SCORE: 0
GAD7 TOTAL SCORE: 0
7. FEELING AFRAID AS IF SOMETHING AWFUL MIGHT HAPPEN: NOT AT ALL
1. FEELING NERVOUS, ANXIOUS, OR ON EDGE: NOT AT ALL
GAD7 TOTAL SCORE: 0
2. NOT BEING ABLE TO STOP OR CONTROL WORRYING: NOT AT ALL
6. BECOMING EASILY ANNOYED OR IRRITABLE: NOT AT ALL
5. BEING SO RESTLESS THAT IT IS HARD TO SIT STILL: NOT AT ALL
4. TROUBLE RELAXING: NOT AT ALL
3. WORRYING TOO MUCH ABOUT DIFFERENT THINGS: NOT AT ALL
7. FEELING AFRAID AS IF SOMETHING AWFUL MIGHT HAPPEN: NOT AT ALL

## 2020-06-26 NOTE — PROGRESS NOTES
Subjective     Rizwana Alford is a 25 year old female who presents to clinic today for the following health issues:    History of Present Illness        She eats 2-3 servings of fruits and vegetables daily.She consumes 1 sweetened beverage(s) daily.She exercises with enough effort to increase her heart rate 10 to 19 minutes per day.  She exercises with enough effort to increase her heart rate 3 or less days per week.   She is taking medications regularly.     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      Objective    Physical Exam   /70 (BP Location: Right arm, Patient Position: Chair, Cuff Size: Adult Large)   Pulse 63   Temp 98.4  F (36.9  C) (Oral)   Resp 20   Wt 103.9 kg (229 lb)   LMP 06/12/2020   SpO2 97%   Breastfeeding No   BMI 36.96 kg/m    Tender over extensor tendon at the DQ point above the wrist , no bony tenderness       Praveen negative         Assessment & Plan     1. De Quervain's disease (tenosynovitis)    - XR Wrist Left G/E 3 Views  - celecoxib (CELEBREX) 200 MG capsule; Take 1 capsule (200 mg) by mouth daily  Dispense: 30 capsule; Refill: 1     Tobacco Cessation:   reports that she has been smoking cigarettes. She has never used smokeless tobacco.  Discussed the role of elevation or sling     Return if symptoms worsen or fail to improve.    Kendell Mullins MD  [facility]  [place]    Answers for HPI/ROS submitted by the patient on 6/26/2020   Chronic problems general questions HPI Form  If you checked off any problems, how difficult have these problems made it for you to do your work, take care of things at home, or get along with other people?: Not difficult at all  PHQ9 TOTAL SCORE: 0  ABRIL 7 TOTAL SCORE: 0

## 2020-06-27 ASSESSMENT — PATIENT HEALTH QUESTIONNAIRE - PHQ9: SUM OF ALL RESPONSES TO PHQ QUESTIONS 1-9: 0

## 2020-06-27 ASSESSMENT — ANXIETY QUESTIONNAIRES: GAD7 TOTAL SCORE: 0

## 2021-01-14 ENCOUNTER — HEALTH MAINTENANCE LETTER (OUTPATIENT)
Age: 26
End: 2021-01-14

## 2021-05-04 ENCOUNTER — TELEPHONE (OUTPATIENT)
Dept: FAMILY MEDICINE | Facility: CLINIC | Age: 26
End: 2021-05-04

## 2021-05-04 NOTE — TELEPHONE ENCOUNTER
Patient Quality Outreach Summary      Summary:    Patient is due/failing the following:   Cervical Cancer Screening - PAP Needed    Type of outreach:    Phone, left message for patient/parent to call back.    Questions for provider review:    None                                                                                                                    Maura Ruiz MA       Chart routed to none.

## 2021-05-05 ENCOUNTER — OFFICE VISIT (OUTPATIENT)
Dept: FAMILY MEDICINE | Facility: CLINIC | Age: 26
End: 2021-05-05
Payer: COMMERCIAL

## 2021-05-05 VITALS
BODY MASS INDEX: 36.26 KG/M2 | OXYGEN SATURATION: 97 % | WEIGHT: 225.6 LBS | RESPIRATION RATE: 18 BRPM | SYSTOLIC BLOOD PRESSURE: 115 MMHG | HEART RATE: 84 BPM | TEMPERATURE: 97.9 F | HEIGHT: 66 IN | DIASTOLIC BLOOD PRESSURE: 76 MMHG

## 2021-05-05 DIAGNOSIS — Z11.59 ENCOUNTER FOR HCV SCREENING TEST FOR LOW RISK PATIENT: ICD-10-CM

## 2021-05-05 DIAGNOSIS — Z23 NEED FOR VACCINATION: ICD-10-CM

## 2021-05-05 DIAGNOSIS — Z12.4 SCREENING FOR MALIGNANT NEOPLASM OF CERVIX: Primary | ICD-10-CM

## 2021-05-05 DIAGNOSIS — J45.20 MILD INTERMITTENT ASTHMA, UNSPECIFIED WHETHER COMPLICATED: ICD-10-CM

## 2021-05-05 DIAGNOSIS — Z11.3 SCREEN FOR STD (SEXUALLY TRANSMITTED DISEASE): ICD-10-CM

## 2021-05-05 DIAGNOSIS — Z11.4 SCREENING FOR HUMAN IMMUNODEFICIENCY VIRUS WITHOUT PRESENCE OF RISK FACTORS: ICD-10-CM

## 2021-05-05 LAB
HCV AB SERPL QL IA: NONREACTIVE
HIV 1+2 AB+HIV1 P24 AG SERPL QL IA: NONREACTIVE
T PALLIDUM AB SER QL: NONREACTIVE

## 2021-05-05 PROCEDURE — 86803 HEPATITIS C AB TEST: CPT | Performed by: FAMILY MEDICINE

## 2021-05-05 PROCEDURE — 87491 CHLMYD TRACH DNA AMP PROBE: CPT | Performed by: FAMILY MEDICINE

## 2021-05-05 PROCEDURE — 90715 TDAP VACCINE 7 YRS/> IM: CPT | Performed by: FAMILY MEDICINE

## 2021-05-05 PROCEDURE — G0145 SCR C/V CYTO,THINLAYER,RESCR: HCPCS | Performed by: FAMILY MEDICINE

## 2021-05-05 PROCEDURE — 36415 COLL VENOUS BLD VENIPUNCTURE: CPT | Performed by: FAMILY MEDICINE

## 2021-05-05 PROCEDURE — 87591 N.GONORRHOEAE DNA AMP PROB: CPT | Performed by: FAMILY MEDICINE

## 2021-05-05 PROCEDURE — 99000 SPECIMEN HANDLING OFFICE-LAB: CPT | Performed by: FAMILY MEDICINE

## 2021-05-05 PROCEDURE — 90471 IMMUNIZATION ADMIN: CPT | Performed by: FAMILY MEDICINE

## 2021-05-05 PROCEDURE — 87389 HIV-1 AG W/HIV-1&-2 AB AG IA: CPT | Performed by: FAMILY MEDICINE

## 2021-05-05 PROCEDURE — 86780 TREPONEMA PALLIDUM: CPT | Mod: 90 | Performed by: FAMILY MEDICINE

## 2021-05-05 PROCEDURE — 99213 OFFICE O/P EST LOW 20 MIN: CPT | Mod: 25 | Performed by: FAMILY MEDICINE

## 2021-05-05 RX ORDER — ALBUTEROL SULFATE 90 UG/1
2 AEROSOL, METERED RESPIRATORY (INHALATION) EVERY 4 HOURS PRN
Qty: 18 G | Refills: 1 | Status: SHIPPED | OUTPATIENT
Start: 2021-05-05

## 2021-05-05 ASSESSMENT — MIFFLIN-ST. JEOR: SCORE: 1785.06

## 2021-05-05 NOTE — PROGRESS NOTES
"    Assessment & Plan     Screening for malignant neoplasm of cervix  - Pap imaged thin layer screen reflex to HPV if ASCUS - recommend age 25 - 29    Screen for STD (sexually transmitted disease)  - NEISSERIA GONORRHOEA PCR  - CHLAMYDIA TRACHOMATIS PCR  - Treponema Abs w Reflex to RPR and Titer    Screening for human immunodeficiency virus without presence of risk factors  - HIV Antigen Antibody Combo    Encounter for HCV screening test for low risk patient  - Hepatitis C Screen Reflex to HCV RNA Quant and Genotype    Mild intermittent asthma, unspecified whether complicated  Discussed use of albuterol with patient as suggestive of uncontrolled asthma currently.  She believes it is just because of the high pollen counts in our area right now.  I recommended she follow up in 2-4 weeks if she continues to use her albuterol on a daily basis.  - albuterol (PROAIR HFA/PROVENTIL HFA/VENTOLIN HFA) 108 (90 Base) MCG/ACT inhaler; Inhale 2 puffs into the lungs every 4 hours as needed for shortness of breath / dyspnea or wheezing    Need for vaccination  - TDAP VACCINE (Adacel, Boostrix)  [[id number]]    23 minutes spent on the date of the encounter doing chart review, history and exam, documentation and further activities per the note     Tobacco Cessation:   reports that she has been smoking cigarettes. She has never used smokeless tobacco.    BMI:   Estimated body mass index is 36.41 kg/m  as calculated from the following:    Height as of this encounter: 1.676 m (5' 6\").    Weight as of this encounter: 102.3 kg (225 lb 9.6 oz).       See Patient Instructions    Return in about 1 year (around 5/5/2022) for Preventative Care Visit.    Madhavi Robertson MD  [facility]    Raheel Wagoner is a 25 year old who presents for the following health issues   HPI     1.  Concern - Pap exam    Patient is due for her Pap Smear.  No history of abnormals, unsure when hers was completed last.  Believes it was " "at [facility] in [place], but cannot recall how long ago.    2.  Would like to get STD testing since we are doing a pap.  No vaginal symptoms or pain with urination.    3.  Started to get asthma symptoms during allergy season.  Has an old albuterol inhaler she is using 1-3 times per day, depending on the day.  Would like refill today, has a history of mild intermittent asthma.    Patient Active Problem List   Diagnosis     Ovarian cysts     Chronic left-sided low back pain with left-sided sciatica     Asthma     Current Outpatient Medications   Medication Sig Dispense Refill     albuterol (PROAIR HFA/PROVENTIL HFA/VENTOLIN HFA) 108 (90 Base) MCG/ACT inhaler Inhale 2 puffs into the lungs every 4 hours as needed for shortness of breath / dyspnea or wheezing 18 g 1         Review of Systems   Constitutional, HEENT, cardiovascular, pulmonary, gi and gu systems are negative, except as otherwise noted.      Objective    /76 (BP Location: Right arm, Patient Position: Chair, Cuff Size: Adult Large)   Pulse 84   Temp 97.9  F (36.6  C) (Oral)   Resp 18   Ht 1.676 m (5' 6\")   Wt 102.3 kg (225 lb 9.6 oz)   SpO2 97%   BMI 36.41 kg/m    Body mass index is 36.41 kg/m .  Physical Exam   GENERAL: healthy, alert and no distress  RESP: lungs clear to auscultation - no rales, rhonchi or wheezes  CV: regular rate and rhythm, normal S1 S2, no S3 or S4, no murmur, click or rub, no peripheral edema and peripheral pulses strong   (female): normal female external genitalia, normal urethral meatus, vaginal mucosa, normal cervix/adnexa/uterus without masses or discharge  SKIN: no suspicious lesions or rashes  PSYCH: mentation appears normal, affect normal/bright            "

## 2021-05-06 LAB
C TRACH DNA SPEC QL NAA+PROBE: NEGATIVE
N GONORRHOEA DNA SPEC QL NAA+PROBE: NEGATIVE
SPECIMEN SOURCE: NORMAL
SPECIMEN SOURCE: NORMAL

## 2021-05-07 LAB
COPATH REPORT: NORMAL
PAP: NORMAL

## 2021-10-24 ENCOUNTER — HEALTH MAINTENANCE LETTER (OUTPATIENT)
Age: 26
End: 2021-10-24

## 2022-02-13 ENCOUNTER — HEALTH MAINTENANCE LETTER (OUTPATIENT)
Age: 27
End: 2022-02-13

## 2022-10-15 ENCOUNTER — HEALTH MAINTENANCE LETTER (OUTPATIENT)
Age: 27
End: 2022-10-15

## 2023-03-26 ENCOUNTER — HEALTH MAINTENANCE LETTER (OUTPATIENT)
Age: 28
End: 2023-03-26